# Patient Record
Sex: MALE | Race: WHITE | NOT HISPANIC OR LATINO | URBAN - METROPOLITAN AREA
[De-identification: names, ages, dates, MRNs, and addresses within clinical notes are randomized per-mention and may not be internally consistent; named-entity substitution may affect disease eponyms.]

---

## 2018-10-17 ENCOUNTER — TRANSFERRED RECORDS (OUTPATIENT)
Dept: HEALTH INFORMATION MANAGEMENT | Facility: CLINIC | Age: 43
End: 2018-10-17

## 2019-07-18 DIAGNOSIS — H35.012 RETINAL VASCULAR CHANGES, LEFT: Primary | ICD-10-CM

## 2019-07-19 ENCOUNTER — OFFICE VISIT (OUTPATIENT)
Dept: OPHTHALMOLOGY | Facility: CLINIC | Age: 44
End: 2019-07-19
Attending: OPHTHALMOLOGY
Payer: COMMERCIAL

## 2019-07-19 DIAGNOSIS — H35.352 CME (CYSTOID MACULAR EDEMA), LEFT: ICD-10-CM

## 2019-07-19 DIAGNOSIS — H35.012 RETINAL VASCULAR CHANGES, LEFT: Primary | ICD-10-CM

## 2019-07-19 PROCEDURE — G0463 HOSPITAL OUTPT CLINIC VISIT: HCPCS | Mod: ZF,25

## 2019-07-19 PROCEDURE — 92133 CPTRZD OPH DX IMG PST SGM ON: CPT | Mod: ZF | Performed by: OPHTHALMOLOGY

## 2019-07-19 PROCEDURE — 92235 FLUORESCEIN ANGRPH MLTIFRAME: CPT | Mod: ZF | Performed by: OPHTHALMOLOGY

## 2019-07-19 PROCEDURE — 92134 CPTRZ OPH DX IMG PST SGM RTA: CPT | Mod: ZF | Performed by: OPHTHALMOLOGY

## 2019-07-19 RX ORDER — COVID-19 ANTIGEN TEST
220 KIT MISCELLANEOUS PRN
COMMUNITY

## 2019-07-19 RX ORDER — PREDNISOLONE ACETATE 10 MG/ML
SUSPENSION/ DROPS OPHTHALMIC
Qty: 10 ML | Refills: 1 | Status: SHIPPED | OUTPATIENT
Start: 2019-07-19 | End: 2019-11-22

## 2019-07-19 ASSESSMENT — VISUAL ACUITY
METHOD: SNELLEN - LINEAR
OS_CC: 20/100
OD_CC: 20/30
OD_PH_CC: 20/20
OS_CC+: -1
OD_CC+: -1
OS_PH_CC: 20/70
OD_PH_CC+: -1

## 2019-07-19 ASSESSMENT — CONF VISUAL FIELD
METHOD: COUNTING FINGERS
OS_NORMAL: 1

## 2019-07-19 ASSESSMENT — REFRACTION_WEARINGRX
OS_CYLINDER: +1.75
SPECS_TYPE: SVL
OD_CYLINDER: +2.75
OS_SPHERE: -13.25
OS_AXIS: 040
OD_SPHERE: -12.25
OD_AXIS: 151

## 2019-07-19 ASSESSMENT — EXTERNAL EXAM - RIGHT EYE: OD_EXAM: NORMAL

## 2019-07-19 ASSESSMENT — EXTERNAL EXAM - LEFT EYE: OS_EXAM: NORMAL

## 2019-07-19 ASSESSMENT — TONOMETRY
OS_IOP_MMHG: 13
IOP_METHOD: APPLANATION
OD_IOP_MMHG: 12

## 2019-07-19 ASSESSMENT — CUP TO DISC RATIO
OD_RATIO: 0.2
OS_RATIO: 0.2

## 2019-07-19 ASSESSMENT — SLIT LAMP EXAM - LIDS
COMMENTS: NORMAL
COMMENTS: NORMAL

## 2019-07-19 NOTE — LETTER
"7/19/2019       RE: Zach Zaragoza  [address]  [place] [place] [facility] [address]     Dear Dr. Johnson,    Thank you for referring your patient, Zach Zaragoza, to the uveitis clinic at the [facility]. I am not certain if he has an inflammatory retinal vasculitis or if his ocular disease is more of a primary vascular disease. Please see a copy of my visit note below.    HPI: Zach Zaragoza is a 43 year old male referred by Dr. 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 Mr. Zaragoza reports that in 2007 he had sudden onset of blurry vision, which he describes as patchy, left eye only. He was seen by an eye care provider in [place], told that he had \"burst capillaries\" in the back of his left eye, and treated with corticosteroid eye drops left eye. Mr. Zaragoza reports that his vision left eye seemed to return to normal after 2 weeks of eye drops, but he did not follow up for eye care at that time. About 1-2 years ago he noticed that vision left eye was blurry again; the blurry vision has been stable since it started. No flashes of light and no new floaters. No concerns right eye.    Current ocular medications: none.  Prior ocular medications: topical corticosteroids.    Ocular history:   1. Retinal vascular abnormality left eye, as above.  2. High myopia both eyes.  3. No history of ocular trauma, surgery, or laser.    Medical history:  1. No chronic medication conditions.  2. Syncopal episode with emesis in Sept 2018 -> unremarkable physical exam/workup at [facility] [place].  3. Uses over the counter melatonin.  4. S/p wisdom teeth extraction (1992).    Review of systems: Negative for fatigue, weight loss, fevers, chills, night sweats, frequent headaches, seizure, oral ulcers, genital ulcers, pathergy reaction, recurrent nosebleeds, sinusitis, hearing loss, tinnitus, chronic cough, chest pain, shortness of breath, skin rash, tick bite, easy bruising, easy bleeding, joint " "pain/stiffness, back pain, recurrent diarrhea, bloody stools, bloody urine.    Family history: Positive for retinal vascular disease \"a blood vessel \" (GM), diabetes mellitus, cancer, arthritis. Negative for autoimmune disease.    Social history: Quit smoking in ; 1-2 alcoholic beverages per day; no IVDU. Works as a . Has never lived outside the [facility], but has traveled to [place], [place], and [place]. Currently has a cat as a pet and previously had a dog.    Laboratory/Imaging Results:  18 complete blood count (CBC) without diff and HIV normal/negative. Glucose mildly elevated at 108.  9.5.18 chest x-ray read as normal.    Ocular Imaging:  Macular OCT 19:  Right eye: within normal limits  Left eye: moderate cystoid macular edema, mild attenuation of EZ centrally    Retinal nerve fiber layer OCT 19:  Right eye: mild vitreopapillary traction, no thinning, G106  Left eye: mild increase in nerve fiber layer thickness compared to right eye, G130    Optos fluorescein angiography 19:  Right eye: ?normal (poor patient cooperation)  Left eye: transit not visualized (poor patient cooperation) but both A/V filled at 30 sec, late petalloid macular leakage, late staining along large venuoles, peripheral point aneurysm-like leakage    Impression/Plan:  1. Retinal vascular changes left eye, with peripheral exudates and telangiectasias. Differential diagnosis: adult-onset Noelle disease vs hemoglobinopathy vs old retinal vein occlusion vs inflammatory retinal vasculitis vs other. Not consistent with diabetic retinopathy and blood glucose 108 last year (likely not fasting test). Unlikely to be ocular ischemic syndrome.   - Quantiferon-Tb, syphilis IgG ordered to rule out infectious causes   - Anti-nuclear antibody, anti-dsDNA, ACE, chest x-ray, ANCA testing ordered for systemic inflammatory eval   - Obtain  records from [place]   - Consider hematology workup if infectious/autoimmune " testing is negative  2. Cystoid macular edema left eye, likely chronic given ellipsoid zone attenuation on OCT.   - Predforte left eye 6x/day for 2 weeks then four times a day    - Consider anti-VEGF injection if not improved next visit    Return to uveitis clinic in 1 month, V/T/D/mac OCT, sooner as needed     Attending Physician Attestation:  Complete documentation of historical and exam elements from today's encounter can be found in the full encounter summary report (not reduplicated in this progress note).  I personally obtained the chief complaint(s) and history of present illness.  I confirmed and edited as necessary the review of systems, past medical/surgical history, family history, social history, and examination findings as documented by others; and I examined the patient myself.  I personally reviewed the relevant tests, images, and reports as documented above.  I formulated and edited as necessary the assessment and plan and discussed the findings and management plan with the patient and family.  - Wanda Duggan M.D.      Again, thank you for allowing me to participate in the care of your patient.      Sincerely,    Wanda Duggan MD

## 2019-07-19 NOTE — PROGRESS NOTES
"HPI: Zach Zaragoza is a 43 year old male referred by Dr. 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 Mr. Zaragoza reports that in  he had sudden onset of blurry vision, which he describes as patchy, left eye only. He was seen by an eye care provider in [place], told that he had \"burst capillaries\" in the back of his left eye, and treated with corticosteroid eye drops left eye. Mr. Zaragoza reports that his vision left eye seemed to return to normal after 2 weeks of eye drops, but he did not follow up for eye care at that time. About 1-2 years ago he noticed that vision left eye was blurry again; the blurry vision has been stable since it started. No flashes of light and no new floaters. No concerns right eye.    Current ocular medications: none.  Prior ocular medications: topical corticosteroids.    Ocular history:   1. Retinal vascular abnormality left eye, as above.  2. High myopia both eyes.  3. No history of ocular trauma, surgery, or laser.    Medical history:  1. No chronic medication conditions.  2. Syncopal episode with emesis in 2018 -> unremarkable physical exam/workup at [facility] [place].  3. Uses over the counter melatonin.  4. S/p wisdom teeth extraction ().    Review of systems: Negative for fatigue, weight loss, fevers, chills, night sweats, frequent headaches, seizure, oral ulcers, genital ulcers, pathergy reaction, recurrent nosebleeds, sinusitis, hearing loss, tinnitus, chronic cough, chest pain, shortness of breath, skin rash, tick bite, easy bruising, easy bleeding, joint pain/stiffness, back pain, recurrent diarrhea, bloody stools, bloody urine.    Family history: Positive for retinal vascular disease \"a blood vessel \" (), diabetes mellitus, cancer, arthritis. Negative for autoimmune disease.    Social history: Quit smoking in ; 1-2 alcoholic beverages per day; no IVDU. Works as a . [place] , but [place] to [place], and " [place]. Currently has a cat as a pet and previously had a dog.    Laboratory/Imaging Results:  9.20.18 complete blood count (CBC) without diff and HIV normal/negative. Glucose mildly elevated at 108.  9.5.18 chest x-ray read as normal.    Ocular Imaging:  Macular OCT 7.19.19:  Right eye: within normal limits  Left eye: moderate cystoid macular edema, mild attenuation of EZ centrally    Retinal nerve fiber layer OCT 7.19.19:  Right eye: mild vitreopapillary traction, no thinning, G106  Left eye: mild increase in nerve fiber layer thickness compared to right eye, G130    Optos fluorescein angiography 7.19.19:  Right eye: ?normal (poor patient cooperation)  Left eye: transit not visualized (poor patient cooperation) but both A/V filled at 30 sec, late petalloid macular leakage, late staining along large venuoles, peripheral point aneurysm-like leakage    Impression/Plan:  1. Retinal vascular changes left eye, with peripheral exudates and telangiectasias. Differential diagnosis: adult-onset Estancia disease vs hemoglobinopathy vs old retinal vein occlusion vs inflammatory retinal vasculitis vs other. Not consistent with diabetic retinopathy and blood glucose 108 last year (likely not fasting test). Unlikely to be ocular ischemic syndrome.   - Quantiferon-Tb, syphilis IgG ordered to rule out infectious causes   - Anti-nuclear antibody, anti-dsDNA, ACE, chest x-ray, ANCA testing ordered for systemic inflammatory eval   - Obtain 2007 records from [place]   - Consider hematology workup if infectious/autoimmune testing is negative  2. Cystoid macular edema left eye, likely chronic given ellipsoid zone attenuation on OCT.   - Predforte left eye 6x/day for 2 weeks then four times a day    - Consider anti-VEGF injection if not improved next visit    Return to uveitis clinic in 1 month, V/T/D/mac OCT, sooner as needed     Attending Physician Attestation:  Complete documentation of historical and exam elements from today's  encounter can be found in the full encounter summary report (not reduplicated in this progress note).  I personally obtained the chief complaint(s) and history of present illness.  I confirmed and edited as necessary the review of systems, past medical/surgical history, family history, social history, and examination findings as documented by others; and I examined the patient myself.  I personally reviewed the relevant tests, images, and reports as documented above.  I formulated and edited as necessary the assessment and plan and discussed the findings and management plan with the patient and family.  - Wanda Duggan M.D.

## 2019-08-14 ENCOUNTER — TELEPHONE (OUTPATIENT)
Dept: OPHTHALMOLOGY | Facility: CLINIC | Age: 44
End: 2019-08-14

## 2019-08-14 NOTE — TELEPHONE ENCOUNTER
LVM for pt that we had moved his appt to 11:00 on 8/16 (previously scheduled at 3:15).  Left direct callback to confirm if this does/doesn't work.   had been left for pt last week as well to call to [facility], and we did not receive any call back at this time.    Eloisa Parker COA 2:04 PM August 14, 2019

## 2019-09-13 ENCOUNTER — OFFICE VISIT (OUTPATIENT)
Dept: OPHTHALMOLOGY | Facility: CLINIC | Age: 44
End: 2019-09-13
Attending: OPHTHALMOLOGY
Payer: COMMERCIAL

## 2019-09-13 DIAGNOSIS — H35.352 CME (CYSTOID MACULAR EDEMA), LEFT: ICD-10-CM

## 2019-09-13 DIAGNOSIS — H35.012 RETINAL VASCULAR CHANGES, LEFT: Primary | ICD-10-CM

## 2019-09-13 PROCEDURE — 92134 CPTRZ OPH DX IMG PST SGM RTA: CPT | Mod: ZF | Performed by: OPHTHALMOLOGY

## 2019-09-13 PROCEDURE — G0463 HOSPITAL OUTPT CLINIC VISIT: HCPCS | Mod: ZF

## 2019-09-13 ASSESSMENT — SLIT LAMP EXAM - LIDS
COMMENTS: NORMAL
COMMENTS: NORMAL

## 2019-09-13 ASSESSMENT — TONOMETRY
OS_IOP_MMHG: 18
IOP_METHOD: TONOPEN
OD_IOP_MMHG: 18

## 2019-09-13 ASSESSMENT — CONF VISUAL FIELD
OS_NORMAL: 1
METHOD: COUNTING FINGERS
OD_NORMAL: 1

## 2019-09-13 ASSESSMENT — VISUAL ACUITY
OS_PH_CC: 20/70
CORRECTION_TYPE: GLASSES
OD_CC: 20/25
METHOD: SNELLEN - LINEAR
OS_PH_CC+: +1
OD_CC+: +3
OS_CC: 20/80
OS_CC+: -1

## 2019-09-13 ASSESSMENT — EXTERNAL EXAM - LEFT EYE: OS_EXAM: NORMAL

## 2019-09-13 ASSESSMENT — CUP TO DISC RATIO
OD_RATIO: 0.2
OS_RATIO: 0.2

## 2019-09-13 ASSESSMENT — EXTERNAL EXAM - RIGHT EYE: OD_EXAM: NORMAL

## 2019-09-13 NOTE — PROGRESS NOTES
"HPI: Zach Zaragoza is a 43 year old male here for 2 month (supposed to be 1 month follow up). He reports that his vision is unchanged from last visit. No flashes of light and no new floaters. No concerns right eye.    Current ocular medications: Predforte left eye four times a day.  Prior ocular medications: topical corticosteroids.    Ocular history:   1. Retinal vascular abnormality left eye.  2019 was referred by Dr. 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 Mr. Zaragoza reports that in  he had sudden onset of blurry vision, which he describes as patchy, left eye only. He was seen by an eye care provider in [place], told that he had \"burst capillaries\" in the back of his left eye, and treated with corticosteroid eye drops left eye. Mr. Zaragoza reports that his vision left eye seemed to return to normal after 2 weeks of eye drops, but he did not follow up for eye care at that time. About [id number] he noticed that vision left eye was blurry again, but he did not seek eye care until Oct 2018 with Dr. Johnson and did not present to uveitis clinic until 2019.  2. High myopia both eyes.  3. No history of ocular trauma, surgery, or laser. No history of ocular injection.    Medical history:  1. No chronic medication conditions.  2. Syncopal episode with emesis in 2018 -> unremarkable physical exam/workup at [facility] [place].  3. Uses over the counter melatonin.  4. S/p wisdom teeth extraction ().    Review of systems: Negative for fatigue, weight loss, fevers, chills, night sweats, frequent headaches, seizure, oral ulcers, genital ulcers, pathergy reaction, recurrent nosebleeds, sinusitis, hearing loss, tinnitus, chronic cough, chest pain, shortness of breath, skin rash, tick bite, easy bruising, easy bleeding, joint pain/stiffness, back pain, recurrent diarrhea, bloody stools, bloody urine.    Family history: Positive for retinal vascular disease \"a blood vessel \" (GM), diabetes mellitus, cancer, " arthritis. Negative for autoimmune disease.    Social history: Quit smoking in 1999; 1-2 alcoholic beverages per day; no IVDU. Works as a . Has never lived outside the [facility], but has traveled to [place], [place], and [place]. Currently has a cat as a pet and previously had a dog.    Laboratory/Imaging Results:  9.20.18 complete blood count (CBC) without diff and HIV normal/negative. Glucose mildly elevated at 108.  9.5.18 chest x-ray read as normal.    Ocular Imaging:  Macular OCT 9.13.19:  Right eye: within normal limits, stable.  Left eye: worsening of cystoid macular edema vs 7.19.19, mild attenuation of EZ centrally    Retinal nerve fiber layer OCT 7.19.19:  Right eye: mild vitreopapillary traction, no thinning, G106  Left eye: mild increase in nerve fiber layer thickness compared to right eye, G130    Optos fluorescein angiography 7.19.19:  Right eye: ?normal (poor patient cooperation)  Left eye: transit not visualized (poor patient cooperation) but both A/V filled at 30 sec, late petalloid macular leakage, late staining along large venuoles, peripheral point aneurysm-like leakage    Impression/Plan:  1. Retinal vascular changes left eye, with peripheral exudates and telangiectasias. Differential diagnosis: adult-onset Noelle disease vs hemoglobinopathy vs old retinal vein occlusion vs inflammatory retinal vasculitis vs other. Not consistent with diabetic retinopathy and blood glucose 108 last year (likely not fasting test). Unlikely to be ocular ischemic syndrome.   - Quantiferon-Tb, syphilis IgG ordered to rule out infectious causes   - Anti-nuclear antibody, anti-dsDNA, ACE, chest x-ray, ANCA testing ordered for systemic inflammatory eval   - Obtain 2007 records from [place] if possible (clinic that patient recalled did not have any records)   - Consider hematology workup if infectious/autoimmune testing is negative  2. Cystoid macular edema left eye, likely chronic given ellipsoid zone  attenuation on OCT. Worsening despite Predforte, and retinal vascular changes are more consistent with adult-onset Noelle.   - Stop Predforte    - Recommend anti-VEGF injectio left eye today; patient wants to wait until he know for certain what the out of pocket charge will be    Return to uveitis clinic in 1 month, V/T/D/mac OCT, sooner as needed. Plan for injection sooner, depending on info from financial counselor.    Attending Physician Attestation:  Complete documentation of historical and exam elements from today's encounter can be found in the full encounter summary report (not reduplicated in this progress note).  I personally obtained the chief complaint(s) and history of present illness.  I confirmed and edited as necessary the review of systems, past medical/surgical history, family history, social history, and examination findings as documented by others; and I examined the patient myself.  I personally reviewed the relevant tests, images, and reports as documented above.  I formulated and edited as necessary the assessment and plan and discussed the findings and management plan with the patient and family.  - Wanda Duggan M.D.

## 2019-09-13 NOTE — NURSING NOTE
Chief Complaints and History of Present Illnesses   Patient presents with     Follow Up     Chief Complaint(s) and History of Present Illness(es)     Follow Up     Laterality: left eye    Frequency: constantly    Timing: throughout the day    Course: stable    Associated symptoms: Negative for eye pain    Pain scale: 0/10              Comments     Recheck retinal vasculitis of LE  No new VA changes, denies pain.  Prednisolone BID to DARION Mancilla COT 2:45 PM 09/13/2019

## 2019-10-07 ENCOUNTER — TELEPHONE (OUTPATIENT)
Dept: OPHTHALMOLOGY | Facility: CLINIC | Age: 44
End: 2019-10-07

## 2019-10-16 ENCOUNTER — OFFICE VISIT (OUTPATIENT)
Dept: OPHTHALMOLOGY | Facility: CLINIC | Age: 44
End: 2019-10-16
Attending: OPHTHALMOLOGY
Payer: COMMERCIAL

## 2019-10-16 DIAGNOSIS — H35.352 CME (CYSTOID MACULAR EDEMA), LEFT: Primary | ICD-10-CM

## 2019-10-16 DIAGNOSIS — H35.012 RETINAL VASCULAR CHANGES, LEFT: ICD-10-CM

## 2019-10-16 PROCEDURE — 92134 CPTRZ OPH DX IMG PST SGM RTA: CPT | Mod: ZF | Performed by: OPHTHALMOLOGY

## 2019-10-16 PROCEDURE — C9257 BEVACIZUMAB INJECTION: HCPCS | Mod: ZF | Performed by: OPHTHALMOLOGY

## 2019-10-16 PROCEDURE — G0463 HOSPITAL OUTPT CLINIC VISIT: HCPCS | Mod: ZF,25

## 2019-10-16 PROCEDURE — 25000128 H RX IP 250 OP 636: Mod: ZF | Performed by: OPHTHALMOLOGY

## 2019-10-16 PROCEDURE — 67028 INJECTION EYE DRUG: CPT | Mod: LT,ZF | Performed by: OPHTHALMOLOGY

## 2019-10-16 RX ADMIN — Medication 1.25 MG: at 14:39

## 2019-10-16 ASSESSMENT — EXTERNAL EXAM - LEFT EYE: OS_EXAM: NORMAL

## 2019-10-16 ASSESSMENT — SLIT LAMP EXAM - LIDS: COMMENTS: NORMAL

## 2019-10-16 ASSESSMENT — VISUAL ACUITY
OS_CC: 20/80
OS_CC+: -1
CORRECTION_TYPE: GLASSES
METHOD: SNELLEN - LINEAR
OD_CC+: -2+2
OD_CC: 20/30
OD_PH_CC: 20/30
OD_PH_CC+: +3

## 2019-10-16 ASSESSMENT — TONOMETRY
IOP_METHOD: TONOPEN
OD_IOP_MMHG: 16
OS_IOP_MMHG: 14

## 2019-10-16 ASSESSMENT — CUP TO DISC RATIO: OS_RATIO: 0.2

## 2019-10-16 NOTE — NURSING NOTE
Chief Complaints and History of Present Illnesses   Patient presents with     Follow Up     Retinal vascular changes, left      Chief Complaint(s) and History of Present Illness(es)     Follow Up     Associated symptoms: floaters (No change.).  Negative for eye pain, dryness, tearing and flashes    Comments: Retinal vascular changes, left               Comments     Pt states VA is the same since last visit.  Pt has no pain other concerns at this time.    DARION Almeida October 16, 2019 2:03 PM

## 2019-10-17 NOTE — PROGRESS NOTES
"HPI: Zach Zaragoza is a 43 year old male here for Avastin intravitreal injection left eye (recommended last visit but patient wanted to wait until medication was reviewed by his insurance so he would know the out of pocket charge). He reports that his vision is unchanged from last visit and continues to be poor left eye compared to right eye. No flashes of light and no new floaters. No concerns right eye.    Current ocular medications: None.  Prior ocular medications: Predforte (ineffective for cystoid macular edema).    Ocular history:   1. Retinal vascular abnormality left eye.  2019 was referred by Dr. 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 Mr. Zaragoza reports that in  he had sudden onset of blurry vision, which he describes as patchy, left eye only. He was seen by an eye care provider in [place], told that he had \"burst capillaries\" in the back of his left eye, and treated with corticosteroid eye drops left eye. Mr. Zaragoza reports that his vision left eye seemed to return to normal after 2 weeks of eye drops, but he did not follow up for eye care at that time. About [id number] he noticed that vision left eye was blurry again, but he did not seek eye care until Oct 2018 with Dr. Johnson and did not present to uveitis clinic until 2019.  2. High myopia both eyes.  3. No history of ocular trauma, surgery, or laser. No history of ocular injection.    Medical history:  1. No chronic medication conditions.  2. Syncopal episode with emesis in 2018 -> [facility].  3. Uses over the counter melatonin.  4. S/p wisdom teeth extraction ().    Family history: Positive for retinal vascular disease \"a blood vessel \" (), diabetes mellitus, cancer, arthritis. Negative for autoimmune disease.    Social history: Quit smoking in ; 1-2 alcoholic beverages per day; no IVDU. Works as a . Has never lived outside the [facility], but has traveled to " [place], [place], and [place]. Currently has a cat as a pet and previously had a dog.    Laboratory/Imaging Results:  9.20.18 complete blood count (CBC) without diff and HIV normal/negative. Glucose mildly elevated at 108.  9.5.18 chest x-ray read as normal.    Ocular Imaging:  Macular OCT 10.16.19:  Right eye: within normal limits, stable.  Left eye: worsening of cystoid macular edema vs 9.13.19, mild attenuation of EZ centrally    Retinal nerve fiber layer OCT 7.19.19:  Right eye: mild vitreopapillary traction, no thinning, G106  Left eye: mild increase in nerve fiber layer thickness compared to right eye, G130    Optos fluorescein angiography 7.19.19:  Right eye: ?normal (poor patient cooperation)  Left eye: transit not visualized (poor patient cooperation) but both A/V filled at 30 sec, late petalloid macular leakage, late staining along large venuoles, peripheral point aneurysm-like leakage    Impression/Plan:  1. Retinal vascular changes left eye, with peripheral exudates and telangiectasias. Differential diagnosis: adult-onset Noelle disease vs hemoglobinopathy vs old retinal vein occlusion vs inflammatory retinal vasculitis vs other. Not consistent with diabetic retinopathy and blood glucose 108 last year (likely not fasting test). Unlikely to be ocular ischemic syndrome.   - If no improvement with anti-VEGF, will obtain Quantiferon-Tb, syphilis IgG, repeat HIV to rule out infectious causes and KEZIA, anti-dsDNA, ACE, chest x-ray, ANCA testing ordered for systemic inflammatory eval(patient wants to limit out of pocket costs)   - Obtain 2007 records from [place] if possible (clinic that patient recalled did not have any records)   - Consider hematology workup if infectious/autoimmune testing is negative  2. Cystoid macular edema left eye, likely chronic given ellipsoid zone attenuation on OCT. Worsening despite Predforte, and retinal vascular changes are more consistent with adult-onset Circle.   - Avastin left  eye given today. Risks/benefits/alternatives discussed. This is the patient's first intravitreal injection, so extensive counseling was performed. We discussed expected symptoms, including blurry vision on the day of injection, small areas of eye redness, mild irritation that resolves within 24 hr. The patient agrees to return to clinic immediately for more worrisome symptoms suggestive of infection (worsening eye redness/pain and/or blurry vision) or retinal detachment (many new floaters, flashing lights, curtain over vision).   - We discussed that a series of injections may be needed, depending on his response to treatment.   - Patient will self-monitor vision in left eye following injection to report on whether Avastin subjectively improves his vision at next visit.    Return to uveitis clinic in 1 month, V/T/D/mac OCT, sooner as needed.    Attending Physician Attestation:  Complete documentation of historical and exam elements from today's encounter can be found in the full encounter summary report (not reduplicated in this progress note).  I personally obtained the chief complaint(s) and history of present illness.  I confirmed and edited as necessary the review of systems, past medical/surgical history, family history, social history, and examination findings as documented by others; and I examined the patient myself.  I personally reviewed the relevant tests, images, and reports as documented above.  I formulated and edited as necessary the assessment and plan and discussed the findings and management plan with the patient and family.  - Wanda Duggan M.D.

## 2019-11-22 ENCOUNTER — OFFICE VISIT (OUTPATIENT)
Dept: OPHTHALMOLOGY | Facility: CLINIC | Age: 44
End: 2019-11-22
Attending: OPHTHALMOLOGY
Payer: COMMERCIAL

## 2019-11-22 DIAGNOSIS — H35.352 CME (CYSTOID MACULAR EDEMA), LEFT: Primary | ICD-10-CM

## 2019-11-22 DIAGNOSIS — H35.012 RETINAL VASCULAR CHANGES, LEFT: ICD-10-CM

## 2019-11-22 PROCEDURE — 92134 CPTRZ OPH DX IMG PST SGM RTA: CPT | Mod: ZF | Performed by: OPHTHALMOLOGY

## 2019-11-22 PROCEDURE — G0463 HOSPITAL OUTPT CLINIC VISIT: HCPCS | Mod: ZF

## 2019-11-22 ASSESSMENT — REFRACTION_WEARINGRX
OD_SPHERE: -12.25
SPECS_TYPE: SVL
OD_AXIS: 151
OD_CYLINDER: +2.75
OS_SPHERE: -13.25
OS_AXIS: 040
OS_CYLINDER: +1.75

## 2019-11-22 ASSESSMENT — TONOMETRY
IOP_METHOD: TONOPEN
OD_IOP_MMHG: 13
OS_IOP_MMHG: 14

## 2019-11-22 ASSESSMENT — VISUAL ACUITY
OS_PH_CC: 20/60
OS_CC: 20/80
OD_CC: 20/25
OS_PH_CC+: -2
METHOD: SNELLEN - LINEAR
OD_CC+: -2
OS_CC+: +1

## 2019-11-22 ASSESSMENT — SLIT LAMP EXAM - LIDS
COMMENTS: NORMAL
COMMENTS: NORMAL

## 2019-11-22 ASSESSMENT — CONF VISUAL FIELD
OS_NORMAL: 1
OD_NORMAL: 1
METHOD: COUNTING FINGERS

## 2019-11-22 ASSESSMENT — EXTERNAL EXAM - RIGHT EYE: OD_EXAM: NORMAL

## 2019-11-22 ASSESSMENT — CUP TO DISC RATIO
OD_RATIO: 0.2
OS_RATIO: 0.2

## 2019-11-22 ASSESSMENT — EXTERNAL EXAM - LEFT EYE: OS_EXAM: NORMAL

## 2019-11-22 NOTE — PROGRESS NOTES
"HPI: Zach Zaragoza is a 43 year old male here for 1 month follow up after initial Avastin intravitreal injection left eye. He reports that his vision had seemed unchanged from last visit (better right eye), though there did appear to be improvement in vision left eye with vision testing today - seemed easier. No flashes of light and no new floaters. No concerns right eye.    Current ocular medications: None.  Prior ocular medications: Predforte (ineffective for cystoid macular edema).    Ocular history:   1. Retinal vascular abnormality left eye.  2019 was referred by Dr. 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 Mr. Zaragoza reports that in  he had sudden onset of blurry vision, which he describes as patchy, left eye only. He was seen by an eye care provider in [place], told that he had \"burst capillaries\" in the back of his left eye, and treated with corticosteroid eye drops left eye. Mr. Zaragoza reports that his vision left eye seemed to return to normal after 2 weeks of eye drops, but he did not follow up for eye care at that time. About 5838-9957 he noticed that vision left eye was blurry again, but he did not seek eye care until Oct 2018 with Dr. Johnson and did not present to uveitis clinic until 2019.  2. High myopia both eyes.  3. No history of ocular trauma, surgery, or laser.    Medical history:  1. No chronic medication conditions.  2. Syncopal episode with emesis in 2018 [place] [facility] [place]  3. Uses over the counter melatonin.  4. S/p wisdom teeth extraction ().    Family history: Positive for retinal vascular disease \"a blood vessel \" (), diabetes mellitus, cancer, arthritis. Negative for autoimmune disease.    Social history: Quit smoking in ; 1-2 alcoholic beverages per day; no IVDU. Works as a . [place] , but [place] traveled to [place], [place], and [place]. Currently has a cat as a pet and " previously had a dog.    Laboratory/Imaging Results:  9.20.18 complete blood count (CBC) without diff and HIV normal/negative. Glucose mildly elevated at 108.  9.5.18 chest x-ray read as normal.    Ocular Imaging:  Macular OCT 11.22.19:  Right eye: within normal limits, stable.  Left eye: substantial improvement in cystoid macular edema vs 10.16.19 with return of foveal contour, mild attenuation of EZ centrally    Retinal nerve fiber layer OCT 7.19.19:  Right eye: mild vitreopapillary traction, no thinning, G106  Left eye: mild increase in nerve fiber layer thickness compared to right eye, G130    Optos fluorescein angiography 7.19.19:  Right eye: ?normal (poor patient cooperation)  Left eye: transit not visualized (poor patient cooperation) but both A/V filled at 30 sec, late petalloid macular leakage, late staining along large venuoles, peripheral point aneurysm-like leakage    Impression/Plan:  1. Retinal vascular changes ( peripheral exudates and telangiectasias) left eye, most consistent with adult-onset [place] disease, although differential diagnosis includes hemoglobinopathy, old retinal vein occlusion, inflammatory retinal vasculitis, other. Not consistent with diabetic retinopathy and blood glucose 108 last year (likely not fasting test). Unlikely to be ocular ischemic syndrome.   - Obtain 2007 records from [place] if possible (clinic that patient recalled did not have any records)   - Additional testing for an underlying inflammatory or systemic vascular etiology should be considered if the right eye develops similar retinal vascular changes  2. Cystoid macular edema left eye secondary to #1, likely chronic given ellipsoid zone attenuation on OCT. Cystoid macular edema much improved today, 1 month s/p intravitreal Avastin left eye #1 (10.16.19).   - Anti-VEGF not indicated today; recheck in 1 month   - Again, we discussed that a series of injections may be needed for adequate control of the cystoid  macular edema  3. High myopia both eyes.   - Manifest refraction once #2 is stabilized    Return to uveitis clinic in 1 month, V/T/D/mac OCT, sooner as needed.    Attending Physician Attestation:  Complete documentation of historical and exam elements from today's encounter can be found in the full encounter summary report (not reduplicated in this progress note).  I personally obtained the chief complaint(s) and history of present illness.  I confirmed and edited as necessary the review of systems, past medical/surgical history, family history, social history, and examination findings as documented by others; and I examined the patient myself.  I personally reviewed the relevant tests, images, and reports as documented above.  I formulated and edited as necessary the assessment and plan and discussed the findings and management plan with the patient and family.  - Wanda Duggan M.D.

## 2019-11-22 NOTE — NURSING NOTE
Chief Complaints and History of Present Illnesses   Patient presents with     Follow Up     Chief Complaint(s) and History of Present Illness(es)     Follow Up     Associated symptoms: floaters (no new ones).  Negative for eye pain, dryness and flashes              Comments     CME (cystoid macular edema), left. He states no change in vision since last visit.     Javier Alexander COT 2:47 PM November 22, 2019

## 2019-12-20 ENCOUNTER — OFFICE VISIT (OUTPATIENT)
Dept: OPHTHALMOLOGY | Facility: CLINIC | Age: 44
End: 2019-12-20
Attending: OPHTHALMOLOGY
Payer: COMMERCIAL

## 2019-12-20 DIAGNOSIS — H35.012 RETINAL VASCULAR CHANGES, LEFT: ICD-10-CM

## 2019-12-20 DIAGNOSIS — H35.352 CME (CYSTOID MACULAR EDEMA), LEFT: Primary | ICD-10-CM

## 2019-12-20 PROCEDURE — 92134 CPTRZ OPH DX IMG PST SGM RTA: CPT | Mod: ZF | Performed by: OPHTHALMOLOGY

## 2019-12-20 PROCEDURE — 67028 INJECTION EYE DRUG: CPT | Mod: LT,ZF | Performed by: OPHTHALMOLOGY

## 2019-12-20 PROCEDURE — G0463 HOSPITAL OUTPT CLINIC VISIT: HCPCS | Mod: ZF

## 2019-12-20 PROCEDURE — C9257 BEVACIZUMAB INJECTION: HCPCS | Mod: ZF | Performed by: OPHTHALMOLOGY

## 2019-12-20 PROCEDURE — 25000128 H RX IP 250 OP 636: Mod: ZF | Performed by: OPHTHALMOLOGY

## 2019-12-20 RX ADMIN — Medication 1.25 MG: at 11:55

## 2019-12-20 ASSESSMENT — REFRACTION_WEARINGRX
OD_SPHERE: -12.25
OD_CYLINDER: +2.75
OD_AXIS: 151
SPECS_TYPE: SVL
OS_CYLINDER: +1.75
OS_AXIS: 040
OS_SPHERE: -13.25

## 2019-12-20 ASSESSMENT — VISUAL ACUITY
OS_PH_CC+: -1
OD_CC: 20/25
CORRECTION_TYPE: GLASSES
OS_CC+: -2
OD_CC+: -2
METHOD: SNELLEN - LINEAR
OS_CC: 20/70
OS_PH_CC: 20/60

## 2019-12-20 ASSESSMENT — TONOMETRY
OS_IOP_MMHG: 19
IOP_METHOD: TONOPEN
OD_IOP_MMHG: 15

## 2019-12-20 ASSESSMENT — SLIT LAMP EXAM - LIDS
COMMENTS: NORMAL
COMMENTS: NORMAL

## 2019-12-20 ASSESSMENT — CUP TO DISC RATIO
OD_RATIO: 0.2
OS_RATIO: 0.2

## 2019-12-20 ASSESSMENT — CONF VISUAL FIELD
METHOD: COUNTING FINGERS
OS_NORMAL: 1

## 2019-12-20 ASSESSMENT — EXTERNAL EXAM - LEFT EYE: OS_EXAM: NORMAL

## 2019-12-20 ASSESSMENT — EXTERNAL EXAM - RIGHT EYE: OD_EXAM: NORMAL

## 2019-12-20 NOTE — NURSING NOTE
Chief Complaint(s) and History of Present Illness(es)     Follow Up     In left eye.  Associated symptoms include Negative for dryness, eye pain, redness and tearing.              Comments     1 month f/u for Retinal vascular abnormality left eye. Pt notes no changes in vision BE since his last visit 1 month ago.     Ocular meds: none    BONNIE Julio 9:47 AM December 20, 2019

## 2019-12-20 NOTE — PROGRESS NOTES
"HPI: Zach Zaragoza is a 44 year old male here for 1 month follow up, now 2 months s/p initial Avastin intravitreal injection left eye. He reports no change in vision since last visit, still blurry left eye. No concerns right eye.    Current ocular medications: None.  Prior ocular medications: Predforte (ineffective for cystoid macular edema).    Ocular history:   1. 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   -  sudden onset of blurry vision \"patchy\" left eye only. He was seen by an eye care provider in [place], told that he had \"burst capillaries\" in the back of his left eye, and treated with corticosteroid eye drops left eye. Mr. Zaragoza reports that his vision left eye seemed to return to normal after 2 weeks of eye drops, but he did not follow up for eye care at that time. About 9913-1348 he noticed that vision left eye was blurry again, but he did not seek eye care until Oct 2018 with Dr. Johnson and did not present to uveitis clinic until 2019.   -  S/p intravitreal Avastin left eye x2 (10.16.19, effective for cystoid macular edema), 19  2. High myopia both eyes.  3. No history of ocular trauma, surgery, or laser.    Medical history:  1. No chronic medication conditions.  2. Syncopal episode with emesis in 2018 [place] [facility] [place]  3. Uses over the counter melatonin.  4. S/p wisdom teeth extraction ().    Family history: Positive for retinal vascular disease \"a blood vessel \" (), diabetes mellitus, cancer, arthritis. Negative for autoimmune disease.    Social history: Quit smoking in ; 1-2 alcoholic beverages per day; no IVDU. Works as a . [place] , but has traveled to [place], [place], and [place]. Currently has a cat as a pet and previously had a dog.    Laboratory/Imaging Results:  18 complete blood count (CBC) without diff and HIV normal/negative. Glucose " mildly elevated at 108.  9.5.18 chest x-ray read as normal.    Ocular Imaging:  Macular OCT 12.20.19:  Right eye: within normal limits, stable.  Left eye: moderate worsening of cystoid macular edema vs 11.22.19, now with central intraretinal fluid, stable mild attenuation of EZ centrally    Retinal nerve fiber layer OCT 7.19.19:  Right eye: mild vitreopapillary traction, no thinning, G106  Left eye: mild increase in nerve fiber layer thickness compared to right eye, G130    Optos fluorescein angiography 7.19.19:  Right eye: ?normal (poor patient cooperation)  Left eye: transit not visualized (poor patient cooperation) but both A/V filled at 30 sec, late petalloid macular leakage, late staining along large venuoles, peripheral point aneurysm-like leakage    Impression/Plan:  1. 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 Not consistent with diabetic retinopathy and blood glucose 108 in 2018 (likely not fasting test). Unlikely to be ocular ischemic syndrome.   - Obtain 2007 records from [place] if possible (clinic that patient recalled did not have any records)   - Additional testing for an underlying inflammatory or systemic vascular etiology should be considered if the right eye develops similar retinal vascular changes    2. Cystoid macular edema left eye secondary to #1, likely chronic given ellipsoid zone attenuation on OCT. Cystoid macular edema had improved with intravitreal Avastin left eye #1 (10.16.19) but now there is central recurrence of fluid.   - Avastin #2 left eye given today, patient counseled to call clinic immediately with any worrisome symptoms post-injection, including eye redness, pain, blurry vision    3. High myopia both eyes.   - Manifest refraction once #2 is stabilized, likely next  visit 1 month s/p injection    Return to uveitis clinic in 1 month, V/T/MR/D/mac OCT, sooner as needed.    Attending Physician Attestation:  Complete documentation of historical and exam elements from today's encounter can be found in the full encounter summary report (not reduplicated in this progress note).  I personally obtained the chief complaint(s) and history of present illness.  I confirmed and edited as necessary the review of systems, past medical/surgical history, family history, social history, and examination findings as documented by others; and I examined the patient myself.  I personally reviewed the relevant tests, images, and reports as documented above.  I formulated and edited as necessary the assessment and plan and discussed the findings and management plan with the patient and family.  - Wanda Duggan M.D.

## 2020-01-24 ENCOUNTER — OFFICE VISIT (OUTPATIENT)
Dept: OPHTHALMOLOGY | Facility: CLINIC | Age: 45
End: 2020-01-24
Attending: OPHTHALMOLOGY
Payer: COMMERCIAL

## 2020-01-24 DIAGNOSIS — H35.352 CME (CYSTOID MACULAR EDEMA), LEFT: Primary | ICD-10-CM

## 2020-01-24 DIAGNOSIS — H35.012 RETINAL VASCULAR CHANGES, LEFT: ICD-10-CM

## 2020-01-24 PROCEDURE — 92015 DETERMINE REFRACTIVE STATE: CPT | Mod: ZF

## 2020-01-24 PROCEDURE — G0463 HOSPITAL OUTPT CLINIC VISIT: HCPCS | Mod: ZF

## 2020-01-24 PROCEDURE — 92134 CPTRZ OPH DX IMG PST SGM RTA: CPT | Mod: ZF | Performed by: OPHTHALMOLOGY

## 2020-01-24 ASSESSMENT — REFRACTION_WEARINGRX
OD_CYLINDER: +2.75
OD_SPHERE: -12.25
OS_AXIS: 040
OD_AXIS: 151
OS_CYLINDER: +1.75
OS_SPHERE: -13.25
SPECS_TYPE: SVL

## 2020-01-24 ASSESSMENT — VISUAL ACUITY
OD_CC+: +1
CORRECTION_TYPE: GLASSES
METHOD: SNELLEN - LINEAR
OD_CC: 20/30
OS_PH_CC: 20/60
OD_PH_CC: 20/25
OS_CC: 20/70

## 2020-01-24 ASSESSMENT — TONOMETRY
IOP_METHOD: TONOPEN
OS_IOP_MMHG: 13
OD_IOP_MMHG: 13

## 2020-01-24 ASSESSMENT — REFRACTION_MANIFEST
OD_SPHERE: -11.75
OD_AXIS: 149
OS_AXIS: 047
OS_CYLINDER: +2.50
OD_CYLINDER: +2.25
OS_SPHERE: -13.25

## 2020-01-24 ASSESSMENT — CONF VISUAL FIELD
OD_NORMAL: 1
OS_NORMAL: 1
METHOD: COUNTING FINGERS

## 2020-01-24 NOTE — PROGRESS NOTES
"HPI: Zach Zaragoza is a 44 year old male here for 1 month follow up, now 1 months s/p Avastin intravitreal injection left eye #2. He reports no change in vision since last visit, still blurry left eye, though vision left eye seemed improved during glasses check today. No concerns right eye.    Current ocular medications: None.  Prior ocular medications: Predforte (ineffective for cystoid macular edema).    Ocular history:   1. 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   -  sudden onset of blurry vision \"patchy\" left eye only. He was seen by an eye care provider in [place], told that he had \"burst capillaries\" in the back of his left eye, and treated with corticosteroid eye drops left eye. Mr. Zaragoza reports that his vision left eye seemed to return to normal after 2 weeks of eye drops, but he did not follow up for eye care at that time. About [id number] he noticed that vision left eye was blurry again, but he did not seek eye care until Oct 2018 with Dr. Johnson and did not present to uveitis clinic until 2019.   -  S/p intravitreal Avastin left eye x2 (10.16.19, effective for cystoid macular edema), 19  2. High myopia both eyes.  3. No history of ocular trauma, surgery, or laser.    Medical history:  1. No chronic medication conditions.  2. Syncopal episode with emesis in 2018 -> [place]remark[place] exam[place] [facility] [place]  3. Uses over the counter melatonin.  4. S/p wisdom teeth extraction ().    Family history: Positive for retinal vascular disease \"a blood vessel \" (), diabetes mellitus, cancer, arthritis. Negative for autoimmune disease.    Social history: Quit smoking in ; 1-2 alcoholic beverages per day; no IVDU. Works as a . [place] , but has [place] to [place], [place]. Currently has a cat as a pet and previously had a dog.    Laboratory/Imaging Results:  18 complete " blood count (CBC) without diff and HIV normal/negative. Glucose mildly elevated at 108.  9.5.18 chest x-ray read as normal.    Ocular Imaging:  Macular OCT 1.27.20:  Right eye: within normal limits, stable.  Left eye: substantial improvement of cystoid macular edema vs 12.20.19 (essentially resolved), rare hyperreflective intraretinal puncta, stable mild attenuation of EZ centrally, also some inner retinal disorganization    Retinal nerve fiber layer OCT 7.19.19:  Right eye: mild vitreopapillary traction, no thinning, G106  Left eye: mild increase in nerve fiber layer thickness compared to right eye, G130    Optos fluorescein angiography 7.19.19:  Right eye: ?normal (poor patient cooperation)  Left eye: transit not visualized (poor patient cooperation) but both A/V filled at 30 sec, late petalloid macular leakage, late staining along large venuoles, peripheral point aneurysm-like leakage    Impression/Plan:  1. Retinal vascular changes (peripheral exudates and telangiectasias) left eye, most consistent with adult-onset [place] disease, although differential diagnosis includes hemoglobinopathy, old retinal vein occlusion, inflammatory retinal vasculitis, other. Not consistent with diabetic retinopathy and blood glucose 108 in 2018 (likely not fasting test). Unlikely to be ocular ischemic syndrome.   - Obtain 2007 records from [place] if possible (clinic that patient recalled did not have any records)   - Additional testing for an underlying inflammatory or systemic vascular etiology should be considered if the right eye develops similar retinal vascular changes, but observe for now   - Consider laser photocoagulation of peripheral aneurysms and areas of nonperfusion, discussed with patient    2. Cystoid macular edema left eye secondary to #1, likely chronic given ellipsoid zone attenuation on OCT. Cystoid macular edema essentially resolved today.   - Monitor    3. High myopia both eyes.   - Manifest refraction today,  best corrected visual acuity left eye improved to 20/50.    Return to uveitis clinic in 1 month, V/T/D/mac OCT, sooner as needed.    Attending Physician Attestation:  Complete documentation of historical and exam elements from today's encounter can be found in the full encounter summary report (not reduplicated in this progress note).  I personally obtained the chief complaint(s) and history of present illness.  I confirmed and edited as necessary the review of systems, past medical/surgical history, family history, social history, and examination findings as documented by others; and I examined the patient myself.  I personally reviewed the relevant tests, images, and reports as documented above.  I formulated and edited as necessary the assessment and plan and discussed the findings and management plan with the patient and family.  - Wanda Duggan M.D.

## 2020-01-27 ASSESSMENT — EXTERNAL EXAM - RIGHT EYE: OD_EXAM: NORMAL

## 2020-01-27 ASSESSMENT — CUP TO DISC RATIO
OS_RATIO: 0.2
OD_RATIO: 0.2

## 2020-01-27 ASSESSMENT — SLIT LAMP EXAM - LIDS
COMMENTS: NORMAL
COMMENTS: NORMAL

## 2020-01-27 ASSESSMENT — EXTERNAL EXAM - LEFT EYE: OS_EXAM: NORMAL

## 2020-04-10 ENCOUNTER — OFFICE VISIT (OUTPATIENT)
Dept: OPHTHALMOLOGY | Facility: CLINIC | Age: 45
End: 2020-04-10
Attending: OPHTHALMOLOGY
Payer: COMMERCIAL

## 2020-04-10 DIAGNOSIS — H35.352 CME (CYSTOID MACULAR EDEMA), LEFT: ICD-10-CM

## 2020-04-10 DIAGNOSIS — H35.012 RETINAL VASCULAR CHANGES, LEFT: ICD-10-CM

## 2020-04-10 PROCEDURE — 25000128 H RX IP 250 OP 636: Mod: ZF | Performed by: OPHTHALMOLOGY

## 2020-04-10 PROCEDURE — C9257 BEVACIZUMAB INJECTION: HCPCS | Mod: ZF | Performed by: OPHTHALMOLOGY

## 2020-04-10 PROCEDURE — G0463 HOSPITAL OUTPT CLINIC VISIT: HCPCS | Mod: 25

## 2020-04-10 PROCEDURE — 67028 INJECTION EYE DRUG: CPT | Mod: LT,ZF | Performed by: OPHTHALMOLOGY

## 2020-04-10 PROCEDURE — 92134 CPTRZ OPH DX IMG PST SGM RTA: CPT | Mod: ZF | Performed by: OPHTHALMOLOGY

## 2020-04-10 RX ADMIN — Medication 1.25 MG: at 14:35

## 2020-04-10 ASSESSMENT — CONF VISUAL FIELD
METHOD: COUNTING FINGERS
OD_NORMAL: 1
OS_NORMAL: 1

## 2020-04-10 ASSESSMENT — VISUAL ACUITY
OS_CC+: -1
OS_PH_CC: 20/60
METHOD: SNELLEN - LINEAR
OD_CC+: -3
OD_CC: 20/25
OS_PH_CC+: -2
OS_CC: 20/100

## 2020-04-10 ASSESSMENT — REFRACTION_WEARINGRX
OD_SPHERE: -12.25
OS_SPHERE: -13.25
OD_CYLINDER: +2.75
OD_AXIS: 151
OS_AXIS: 040
OS_CYLINDER: +1.75
SPECS_TYPE: SVL

## 2020-04-10 ASSESSMENT — CUP TO DISC RATIO
OS_RATIO: 0.2
OD_RATIO: 0.2

## 2020-04-10 ASSESSMENT — EXTERNAL EXAM - RIGHT EYE: OD_EXAM: NORMAL

## 2020-04-10 ASSESSMENT — SLIT LAMP EXAM - LIDS
COMMENTS: NORMAL
COMMENTS: NORMAL

## 2020-04-10 ASSESSMENT — TONOMETRY
OS_IOP_MMHG: 18
OD_IOP_MMHG: 15
IOP_METHOD: TONOPEN

## 2020-04-10 ASSESSMENT — EXTERNAL EXAM - LEFT EYE: OS_EXAM: NORMAL

## 2020-04-10 NOTE — PROGRESS NOTES
"HPI: Zach Zaragoza is a 44 year old male here for 3 month follow up, now 4 months s/p Avastin intravitreal injection left eye #2. He reports that his eyes seem about the same as last visit.    Current ocular medications: None.  Prior ocular medications: Predforte (ineffective for cystoid macular edema).    Ocular history:   1. 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   -  sudden onset of blurry vision \"patchy\" left eye only. He was seen by an eye care provider in [place], told that he had \"burst capillaries\" in the back of his left eye, and treated with corticosteroid eye drops left eye. Mr. Zaragoza reports that his vision left eye seemed to return to normal after 2 weeks of eye drops, but he did not follow up for eye care at that time. About [id number] he noticed that vision left eye was blurry again, but he did not seek eye care until Oct 2018 with Dr. Johnson and did not present to uveitis clinic until 2019.   -  S/p intravitreal Avastin left eye x3 (10.16.19, 12.20.19, 4.10.20)  2. High myopia both eyes.  3. No history of ocular trauma, surgery, or laser.    Medical history:  1. No chronic medication conditions.  2. Syncopal episode with emesis in 2018 [place] [facility] [place]  3. Uses over the counter melatonin.  4. S/p wisdom teeth extraction ().    Family history: Positive for retinal vascular disease \"a blood vessel \" (GM), diabetes mellitus, cancer, arthritis. Negative for autoimmune disease.    Social history: Quit smoking in ; 1-2 alcoholic beverages per day; no IVDU. Works as a . Has never lived outside the [facility], but has traveled to [place], [place], and [place]. Currently has a cat as a pet and previously had a dog.    Laboratory/Imaging Results:  18 complete blood count (CBC) without diff and HIV normal/negative. Glucose mildly elevated at 108.  9.5.18 chest x-ray read as normal.    Ocular " Imaging:  Macular OCT 4.10.20:  Right eye: within normal limits, stable.  Left eye: moderate/severe cystoid macular edema, more hyperreflective intraretinal puncta, stable mild attenuation of EZ centrally, also some inner retinal disorganization    Retinal nerve fiber layer OCT 7.19.19:  Right eye: mild vitreopapillary traction, no thinning, G106  Left eye: mild increase in nerve fiber layer thickness compared to right eye, G130    Optos fluorescein angiography 7.19.19:  Right eye: ?normal (poor patient cooperation)  Left eye: transit not visualized (poor patient cooperation) but both A/V filled at 30 sec, late petalloid macular leakage, late staining along large venuoles, peripheral point aneurysm-like leakage    Impression/Plan:  1. Retinal vascular changes (peripheral exudates and telangiectasias) left eye, most consistent with adult-onset [place] disease, although differential diagnosis includes hemoglobinopathy, old retinal vein occlusion, inflammatory retinal vasculitis, other. Not consistent with diabetic retinopathy and blood glucose 108 in 2018 (likely not fasting test). Unlikely to be ocular ischemic syndrome.   - Obtain 2007 records from [place] if possible (clinic that patient recalled did not have any records)   - Additional testing for an underlying inflammatory or systemic vascular etiology should be considered if the right eye develops similar retinal vascular changes, but observe for now   - Consider laser photocoagulation of peripheral aneurysms and areas of nonperfusion, discussed with patient    2. Cystoid macular edema left eye secondary to #1, likely chronic given ellipsoid zone attenuation on OCT. Cystoid macular edema worse today   - Avastin #3 left eye given today; patient had vaso-vagal episode immediately following injection, so he was closely observed for 30 minutes (during which vital signs returned to normal and symptoms nearly resolved) and then accompanied home by his significant  other    Return to uveitis clinic in 2 months, V/T/D/mac OCT, sooner as needed.    Attending Physician Attestation:  Complete documentation of historical and exam elements from today's encounter can be found in the full encounter summary report (not reduplicated in this progress note).  I personally obtained the chief complaint(s) and history of present illness.  I confirmed and edited as necessary the review of systems, past medical/surgical history, family history, social history, and examination findings as documented by others; and I examined the patient myself.  I personally reviewed the relevant tests, images, and reports as documented above.  I formulated and edited as necessary the assessment and plan and discussed the findings and management plan with the patient and family.  - Wanda Duggan M.D.

## 2020-04-10 NOTE — NURSING NOTE
Chief Complaints and History of Present Illnesses   Patient presents with     Uveitis Follow-Up     Chief Complaint(s) and History of Present Illness(es)     Uveitis Follow-Up     Laterality: left eye    Associated symptoms: Negative for eye pain    Pain scale: 0/10              Comments     Zach is here for a follow up of CME (cystoid macular edema), left. He states no changes in vision since last visit    Javier Alexander COT 12:39 PM April 10, 2020

## 2020-07-03 ENCOUNTER — OFFICE VISIT (OUTPATIENT)
Dept: OPHTHALMOLOGY | Facility: CLINIC | Age: 45
End: 2020-07-03
Attending: OPHTHALMOLOGY
Payer: COMMERCIAL

## 2020-07-03 DIAGNOSIS — H35.352 CME (CYSTOID MACULAR EDEMA), LEFT: Primary | ICD-10-CM

## 2020-07-03 DIAGNOSIS — H35.012 RETINAL VASCULAR CHANGES, LEFT: ICD-10-CM

## 2020-07-03 PROBLEM — R73.9 HYPERGLYCEMIA: Status: ACTIVE | Noted: 2018-09-20

## 2020-07-03 PROCEDURE — 92134 CPTRZ OPH DX IMG PST SGM RTA: CPT | Mod: ZF | Performed by: OPHTHALMOLOGY

## 2020-07-03 PROCEDURE — 67028 INJECTION EYE DRUG: CPT | Mod: LT,ZF | Performed by: OPHTHALMOLOGY

## 2020-07-03 PROCEDURE — 25000128 H RX IP 250 OP 636: Mod: ZF | Performed by: OPHTHALMOLOGY

## 2020-07-03 PROCEDURE — G0463 HOSPITAL OUTPT CLINIC VISIT: HCPCS | Mod: 25

## 2020-07-03 RX ADMIN — Medication 1.25 MG: at 13:26

## 2020-07-03 ASSESSMENT — TONOMETRY
IOP_METHOD: ICARE
OS_IOP_MMHG: 13
OD_IOP_MMHG: 12

## 2020-07-03 ASSESSMENT — VISUAL ACUITY
OD_CC+: -2
OS_PH_CC+: -2
OS_CC: 20/70
OD_CC: 20/25
METHOD: SNELLEN - LINEAR
OS_PH_CC: 20/60
CORRECTION_TYPE: GLASSES

## 2020-07-03 ASSESSMENT — CONF VISUAL FIELD
OD_NORMAL: 1
OS_NORMAL: 1

## 2020-07-03 ASSESSMENT — SLIT LAMP EXAM - LIDS
COMMENTS: NORMAL
COMMENTS: NORMAL

## 2020-07-03 ASSESSMENT — EXTERNAL EXAM - RIGHT EYE: OD_EXAM: NORMAL

## 2020-07-03 ASSESSMENT — REFRACTION_WEARINGRX
OS_SPHERE: -13.25
OD_AXIS: 149
OS_AXIS: 047
SPECS_TYPE: SVL
OD_CYLINDER: +2.25
OS_CYLINDER: +2.50
OD_SPHERE: -11.75

## 2020-07-03 ASSESSMENT — EXTERNAL EXAM - LEFT EYE: OS_EXAM: NORMAL

## 2020-07-03 ASSESSMENT — CUP TO DISC RATIO
OD_RATIO: 0.2
OS_RATIO: 0.2

## 2020-07-03 NOTE — NURSING NOTE
Chief Complaints and History of Present Illnesses   Patient presents with     Follow Up     Chief Complaint(s) and History of Present Illness(es)     Follow Up     Laterality: left eye    Course: stable    Associated symptoms: floaters.  Negative for eye pain and flashes    Treatments tried: no treatments    Pain scale: 0/10              Comments     3mo f/u CME OS    Vision stable, has noted a slight increase in floaters OS; OD asymptomatic.     Michelle Caldwell COT 12:15 PM July 3, 2020

## 2020-07-03 NOTE — PROGRESS NOTES
"HPI: Zach Zaragoza is a 44 year old male here for 3 month (supposed to be 2 month) follow up, now 3 months s/p intravitreal Avastin injection left eye #3. He reports that his eyes seem about the same as last visit except for about 2 more floaters left eye, no flashes.     Current ocular medications: None.  Prior ocular medications: Predforte (ineffective for cystoid macular edema).    Ocular history:   1. 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   -  sudden onset of blurry vision \"patchy\" left eye only. He was seen by an eye care provider in [place], told that he had \"burst capillaries\" in the back of his left eye, and treated with corticosteroid eye drops left eye. Mr. Zaragoza reports that his vision left eye seemed to return to normal after 2 weeks of eye drops, but he did not follow up for eye care at that time. About 0255-2793 he noticed that vision left eye was blurry again, but he did not seek eye care until Oct 2018 with Dr. Johnson and did not present to uveitis clinic until 2019.   -  S/p intravitreal Avastin left eye x3 (10.16.19, 12.20.19, 4.10.20)  2. High myopia both eyes.  3. No history of ocular trauma, surgery, or laser.    Medical history:  1. No chronic medication conditions.  2. Syncopal episode with emesis in 2018 [place] [facility] [place]  3. Uses over the counter melatonin.  4. S/p wisdom teeth extraction ().    Family history: Positive for retinal vascular disease \"a blood vessel \" (), diabetes mellitus, cancer, arthritis. Negative for autoimmune disease.    Social history: Quit smoking in ; 1-2 alcoholic beverages per day; no IVDU. Works as a . [place] , but [place] to [place], [place], and [place]. Currently has a cat as a pet and previously had a dog.    Laboratory/Imaging Results:  18 complete blood count (CBC) without diff and HIV " normal/negative. Glucose mildly elevated at 108.  9.5.18 chest x-ray read as normal.    Ocular Imaging:  Macular OCT 7.3.20:  Right eye: within normal limits, stable.  Left eye: moderate central cystoid macular edema slightly improved vs 4.10.20, hyperreflective intraretinal puncta, stable mild attenuation of EZ centrally, also some inner retinal disorganization    Retinal nerve fiber layer OCT 7.19.19:  Right eye: mild vitreopapillary traction, no thinning, G106  Left eye: mild increase in nerve fiber layer thickness compared to right eye, G130    Optos fluorescein angiography 7.19.19:  Right eye: ?normal (poor patient cooperation)  Left eye: transit not visualized (poor patient cooperation) but both A/V filled at 30 sec, late petalloid macular leakage, late staining along large venuoles, peripheral point aneurysm-like leakage    Impression/Plan:  1. 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 Not consistent with diabetic retinopathy and blood glucose 108 in 2018 (likely not fasting test). Unlikely to be ocular ischemic syndrome.   - Obtain 2007 records from [place] if possible (clinic that patient recalled did not have any records)   - Additional testing for an underlying inflammatory or systemic vascular etiology should be considered if the right eye develops similar retinal vascular changes, but observe for now   - Consider laser photocoagulation of peripheral aneurysms and areas of nonperfusion, discussed with patient but he is not interested at this time    2. Cystoid macular edema left eye secondary to #1, likely chronic given ellipsoid zone attenuation on OCT. Cystoid macular edema still present but slightly improved today.   - Avastin #4 left eye given today; he did well accompanied by his significant  other   - Treat and extend for now with 6 week interval for next visit    Return to uveitis clinic in 6 weeks, V/T/D/mac OCT/Avastin left eye, sooner as needed.    Attending Physician Attestation:  Complete documentation of historical and exam elements from today's encounter can be found in the full encounter summary report (not reduplicated in this progress note).  I personally obtained the chief complaint(s) and history of present illness.  I confirmed and edited as necessary the review of systems, past medical/surgical history, family history, social history, and examination findings as documented by others; and I examined the patient myself.  I personally reviewed the relevant tests, images, and reports as documented above.  I formulated and edited as necessary the assessment and plan and discussed the findings and management plan with the patient and family.  - Wanda Duggan M.D.

## 2020-08-14 ENCOUNTER — OFFICE VISIT (OUTPATIENT)
Dept: OPHTHALMOLOGY | Facility: CLINIC | Age: 45
End: 2020-08-14
Attending: OPHTHALMOLOGY
Payer: COMMERCIAL

## 2020-08-14 DIAGNOSIS — H35.352 CME (CYSTOID MACULAR EDEMA), LEFT: Primary | ICD-10-CM

## 2020-08-14 DIAGNOSIS — H35.012 RETINAL VASCULAR CHANGES, LEFT: ICD-10-CM

## 2020-08-14 PROCEDURE — G0463 HOSPITAL OUTPT CLINIC VISIT: HCPCS | Mod: 25

## 2020-08-14 PROCEDURE — 25000128 H RX IP 250 OP 636: Mod: ZF | Performed by: OPHTHALMOLOGY

## 2020-08-14 PROCEDURE — 92134 CPTRZ OPH DX IMG PST SGM RTA: CPT | Mod: ZF | Performed by: OPHTHALMOLOGY

## 2020-08-14 PROCEDURE — 67028 INJECTION EYE DRUG: CPT | Mod: LT,ZF | Performed by: OPHTHALMOLOGY

## 2020-08-14 RX ADMIN — Medication 1.25 MG: at 12:58

## 2020-08-14 ASSESSMENT — VISUAL ACUITY
CORRECTION_TYPE: GLASSES
OD_CC+: -3
OS_CC+: +1
OD_CC: 20/25
OS_CC: 20/80
METHOD: SNELLEN - LINEAR

## 2020-08-14 ASSESSMENT — REFRACTION_WEARINGRX
OS_CYLINDER: +2.50
OS_AXIS: 047
OD_CYLINDER: +2.25
OD_AXIS: 149
OS_SPHERE: -13.25
SPECS_TYPE: SVL
OD_SPHERE: -11.75

## 2020-08-14 ASSESSMENT — CONF VISUAL FIELD
METHOD: COUNTING FINGERS
OS_NORMAL: 1
OD_NORMAL: 1

## 2020-08-14 ASSESSMENT — SLIT LAMP EXAM - LIDS
COMMENTS: NORMAL
COMMENTS: NORMAL

## 2020-08-14 ASSESSMENT — CUP TO DISC RATIO
OD_RATIO: 0.2
OS_RATIO: 0.2

## 2020-08-14 ASSESSMENT — TONOMETRY
OS_IOP_MMHG: 15
OD_IOP_MMHG: 17
IOP_METHOD: TONOPEN

## 2020-08-14 ASSESSMENT — EXTERNAL EXAM - LEFT EYE: OS_EXAM: NORMAL

## 2020-08-14 ASSESSMENT — EXTERNAL EXAM - RIGHT EYE: OD_EXAM: NORMAL

## 2020-08-14 NOTE — NURSING NOTE
Chief Complaints and History of Present Illnesses   Patient presents with     Follow Up     6 week follow up Retinal vascular abnormality left eye      Chief Complaint(s) and History of Present Illness(es)     Follow Up     Laterality: left eye    Quality: blurred vision    Course: stable    Associated symptoms: Negative for dryness, flashes, floaters, tearing, eye pain and discharge    Pain scale: 0/10    Comments: 6 week follow up Retinal vascular abnormality left eye               Comments     Pt denies any significant vision changes in either eye since last visit.  Denies any flashes, floaters, pain, pressure, irritation, discharge, and tearing.  Ocular Meds: None    Jane Rosales OT 11:46 AM August 14, 2020

## 2020-08-14 NOTE — PROGRESS NOTES
"HPI: Zach Zaragoza is a 44 year old male here for 6 week follow up; 6 wks s/p intravitreal Avastin injection left eye #4. He reports that his vision seems stable. No new eye concerns.    Current ocular medications: None.  Prior ocular medications: Predforte (ineffective for cystoid macular edema).    Ocular history:   1. 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   -  sudden onset of blurry vision \"patchy\" left eye only. He was seen by an eye care provider in [place], told that he had \"burst capillaries\" in the back of his left eye, and treated with corticosteroid eye drops left eye. Mr. Zaragoza reports that his vision left eye seemed to return to normal after 2 weeks of eye drops, but he did not follow up for eye care at that time. About 3628-3513 he noticed that vision left eye was blurry again, but he did not seek eye care until Oct 2018 with Dr. Johnson and did not present to uveitis clinic until 2019.   -  S/p intravitreal Avastin left eye x3 (10.16.19, 12.20.19, 4.10.20)  2. High myopia both eyes.  3. No history of ocular trauma, surgery, or laser.    Medical history:  1. No chronic medication conditions.  2. Syncopal episode with emesis in 2018 -> unremarkable physical exam/workup at [facility] [place].  3. Uses over the counter melatonin.  4. S/p wisdom teeth extraction ().    Family history: Positive for retinal vascular disease \"a blood vessel \" (GM), diabetes mellitus, cancer, arthritis. Negative for autoimmune disease.    Social history: Quit smoking in ; 1-2 alcoholic beverages per day; no IVDU. Works as a . Has never lived outside the [facility], but has traveled to [place], [place], and [place]. Currently has a cat as a pet and previously had a dog.    Laboratory/Imaging Results:  18 complete blood count (CBC) without diff and HIV normal/negative. Glucose mildly elevated at 108.  9.5.18 chest x-ray read as normal.    Ocular " Imaging:  Macular OCT 8.14.20:  Right eye: within normal limits, stable.  Left eye: moderate central cystoid macular edema improved but not resolved vs 7.3.20, hyperreflective intraretinal puncta, stable mild attenuation of EZ centrally, also some inner retinal disorganization    Retinal nerve fiber layer OCT 7.19.19:  Right eye: mild vitreopapillary traction, no thinning, G106  Left eye: mild increase in nerve fiber layer thickness compared to right eye, G130    Optos fluorescein angiography 7.19.19:  Right eye: ?normal (poor patient cooperation)  Left eye: transit not visualized (poor patient cooperation) but both A/V filled at 30 sec, late petalloid macular leakage, late staining along large venuoles, peripheral point aneurysm-like leakage    Impression/Plan:  1. 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 Not consistent with diabetic retinopathy and blood glucose 108 in 2018 (likely not fasting test). Unlikely to be ocular ischemic syndrome.   - Obtain 2007 records from [place] if possible (clinic that patient recalled did not have any records)   - Additional testing for an underlying inflammatory or systemic vascular etiology should be considered if the right eye develops similar retinal vascular changes, but observe for now   - Consider laser photocoagulation of peripheral aneurysms and areas of nonperfusion, discussed with patient but he is not interested at this time    2. Cystoid macular edema left eye secondary to #1, likely chronic given ellipsoid zone attenuation on OCT. Cystoid macular edema still present but slightly improved today.   - Avastin #5 left eye given today; he did well accompanied by his significant other   - Treat with 4 week interval for next visit    Return to uveitis clinic in 4  weeks, V/T/D/mac OCT/Avastin left eye, sooner as needed.    Attending Physician Attestation:  Complete documentation of historical and exam elements from today's encounter can be found in the full encounter summary report (not reduplicated in this progress note).  I personally obtained the chief complaint(s) and history of present illness.  I confirmed and edited as necessary the review of systems, past medical/surgical history, family history, social history, and examination findings as documented by others; and I examined the patient myself.  I personally reviewed the relevant tests, images, and reports as documented above.  I formulated and edited as necessary the assessment and plan and discussed the findings and management plan with the patient and family.  - Wanda Duggan M.D.

## 2020-09-18 ENCOUNTER — OFFICE VISIT (OUTPATIENT)
Dept: OPHTHALMOLOGY | Facility: CLINIC | Age: 45
End: 2020-09-18
Attending: OPHTHALMOLOGY
Payer: COMMERCIAL

## 2020-09-18 DIAGNOSIS — H35.012 RETINAL VASCULAR CHANGES, LEFT: ICD-10-CM

## 2020-09-18 DIAGNOSIS — H35.352 CME (CYSTOID MACULAR EDEMA), LEFT: Primary | ICD-10-CM

## 2020-09-18 PROCEDURE — 92134 CPTRZ OPH DX IMG PST SGM RTA: CPT | Mod: ZF | Performed by: OPHTHALMOLOGY

## 2020-09-18 PROCEDURE — 25000128 H RX IP 250 OP 636: Mod: ZF | Performed by: OPHTHALMOLOGY

## 2020-09-18 PROCEDURE — G0463 HOSPITAL OUTPT CLINIC VISIT: HCPCS | Mod: 25

## 2020-09-18 PROCEDURE — 67028 INJECTION EYE DRUG: CPT | Mod: LT,ZF | Performed by: OPHTHALMOLOGY

## 2020-09-18 RX ADMIN — Medication 1.25 MG: at 14:30

## 2020-09-18 ASSESSMENT — VISUAL ACUITY
METHOD: SNELLEN - LINEAR
CORRECTION_TYPE: GLASSES
OS_PH_CC: 20/70
OD_CC: 20/25
OS_CC: 20/80

## 2020-09-18 ASSESSMENT — CONF VISUAL FIELD
OS_NORMAL: 1
OD_NORMAL: 1

## 2020-09-18 ASSESSMENT — TONOMETRY
OS_IOP_MMHG: 18
IOP_METHOD: ICARE
OD_IOP_MMHG: 18

## 2020-09-18 ASSESSMENT — SLIT LAMP EXAM - LIDS
COMMENTS: NORMAL
COMMENTS: NORMAL

## 2020-09-18 ASSESSMENT — EXTERNAL EXAM - LEFT EYE: OS_EXAM: NORMAL

## 2020-09-18 ASSESSMENT — CUP TO DISC RATIO
OD_RATIO: 0.2
OS_RATIO: 0.2

## 2020-09-18 ASSESSMENT — EXTERNAL EXAM - RIGHT EYE: OD_EXAM: NORMAL

## 2020-09-18 NOTE — PROGRESS NOTES
"HPI: Zach Zaragoza is a 44 year old male here for 1 month follow up, here for scheduled Avastin left eye. He reports that his vision seems stable. No new eye concerns.    Current ocular medications: None.  Prior ocular medications: Predforte (ineffective for cystoid macular edema).    Ocular history:   1. 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   -  sudden onset of blurry vision \"patchy\" left eye only. He was seen by an eye care provider in [place], told that he had \"burst capillaries\" in the back of his left eye, and treated with corticosteroid eye drops left eye. Mr. Zaragoza reports that his vision left eye seemed to return to normal after 2 weeks of eye drops, but he did not follow up for eye care at that time. About 6089-2492 he noticed that vision left eye was blurry again, but he did not seek eye care until Oct 2018 with Dr. Johnson and did not present to uveitis clinic until 2019.   -  S/p intravitreal Avastin left eye (10.16.19, 12.20.19, 4.10.20, 7.3.20, 8.14.20, 9.18.20)  2. High myopia both eyes.  3. No history of ocular trauma, surgery, or laser.    Medical history:  1. No chronic medication conditions.  2. Syncopal episode with emesis in 2018 -> unremarkable physical exam/workup at [facility] [place].  3. Uses over the counter melatonin.  4. S/p wisdom teeth extraction ().    Family history: Positive for retinal vascular disease \"a blood vessel \" (), diabetes mellitus, cancer, arthritis. Negative for autoimmune disease.    Social history: Quit smoking in ; 1-2 alcoholic beverages per day; no IVDU. Works as a . Has never lived outside the [facility], but has traveled to [place], [place], and [place]. Currently has a cat as a pet and previously had a dog.    Laboratory/Imaging Results:  18 complete blood count (CBC) without diff and HIV normal/negative. Glucose mildly elevated at 108.  9.5.18 chest x-ray read as normal.    Ocular " Imaging:  Macular OCT 9.18.20:  Right eye: within normal limits, stable.  Left eye: moderate central cystoid macular edema improved but not resolved vs 8.14.20, hyperreflective intraretinal puncta, stable mild attenuation of EZ centrally, also some inner retinal disorganization    Retinal nerve fiber layer OCT 7.19.19:  Right eye: mild vitreopapillary traction, no thinning, G106  Left eye: mild increase in nerve fiber layer thickness compared to right eye, G130    Optos fluorescein angiography 7.19.19:  Right eye: ?normal (poor patient cooperation)  Left eye: transit not visualized (poor patient cooperation) but both A/V filled at 30 sec, late petalloid macular leakage, late staining along large venuoles, peripheral point aneurysm-like leakage    Impression/Plan:  1. 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 Not consistent with diabetic retinopathy and blood glucose 108 in 2018 (likely not fasting test). Unlikely to be ocular ischemic syndrome.   - Obtain 2007 records from [place] if possible (clinic that patient recalled did not have any records)   - Additional testing for an underlying inflammatory or systemic vascular etiology should be considered if the right eye develops similar retinal vascular changes, but observe for now   - Consider laser photocoagulation of peripheral aneurysms and areas of nonperfusion, discussed with patient but he is not interested at this time    2. Cystoid macular edema left eye secondary to #1, likely chronic given ellipsoid zone attenuation on OCT and history. Cystoid macular edema still present but slightly improved today.   - Avastin #6 left eye given today; he did start having vasovagal symptoms immediately after injection, which slowly resolved after he was positioned  supine in exam chair   - Today is #1 of 3 in q4wk Avastin series; will switch to intravitreal triamcinolone (have been holding off on this since patient is phakic) or different anti-VEGF agent if not resolved after this series    Return to uveitis clinic in 4 weeks, V/T/D/mac OCT/Avastin left eye, sooner as needed.    Attending Physician Attestation:  Complete documentation of historical and exam elements from today's encounter can be found in the full encounter summary report (not reduplicated in this progress note).  I personally obtained the chief complaint(s) and history of present illness.  I confirmed and edited as necessary the review of systems, past medical/surgical history, family history, social history, and examination findings as documented by others; and I examined the patient myself.  I personally reviewed the relevant tests, images, and reports as documented above.  I formulated and edited as necessary the assessment and plan and discussed the findings and management plan with the patient and family.  - Wanda Duggan M.D.

## 2020-10-16 ENCOUNTER — OFFICE VISIT (OUTPATIENT)
Dept: OPHTHALMOLOGY | Facility: CLINIC | Age: 45
End: 2020-10-16
Attending: OPHTHALMOLOGY
Payer: COMMERCIAL

## 2020-10-16 DIAGNOSIS — H35.012 RETINAL VASCULAR CHANGES, LEFT: ICD-10-CM

## 2020-10-16 DIAGNOSIS — H35.352 CME (CYSTOID MACULAR EDEMA), LEFT: ICD-10-CM

## 2020-10-16 PROCEDURE — G0463 HOSPITAL OUTPT CLINIC VISIT: HCPCS

## 2020-10-16 PROCEDURE — 99207 PR NO CHARGE NURSE ONLY: CPT | Performed by: OPHTHALMOLOGY

## 2020-10-16 ASSESSMENT — TONOMETRY
IOP_METHOD: ICARE
OS_IOP_MMHG: 13
OD_IOP_MMHG: 14

## 2020-10-16 ASSESSMENT — REFRACTION_WEARINGRX
OD_SPHERE: -11.75
OS_AXIS: 047
OS_CYLINDER: +2.50
OS_SPHERE: -13.25
OD_AXIS: 149
SPECS_TYPE: SVL
OD_CYLINDER: +2.25

## 2020-10-16 ASSESSMENT — VISUAL ACUITY
OD_CC: 20/20
CORRECTION_TYPE: GLASSES
OS_PH_CC+: +2
OS_CC+: -2
OD_CC+: -2
OS_CC: 20/70
METHOD: SNELLEN - LINEAR
OS_PH_CC: 20/70

## 2020-10-16 ASSESSMENT — CONF VISUAL FIELD
OS_NORMAL: 1
METHOD: COUNTING FINGERS
OD_NORMAL: 1

## 2020-10-16 NOTE — NURSING NOTE
Chief Complaints and History of Present Illnesses   Patient presents with     Follow Up     CME (cystoid macular edema), left      Chief Complaint(s) and History of Present Illness(es)     Follow Up     Laterality: left eye    Onset: gradual    Onset: years ago    Course: stable    Associated symptoms: floaters.  Negative for eye pain, photophobia, flashes, dryness and tearing    Pain scale: 0/10    Comments: CME (cystoid macular edema), left               Comments     Pt states vision is stable since last visit.  About 30 min after last injection he developed a floater LE that disappeared the next day.  Still has his old floaters.    DARION Almeida October 16, 2020 12:58 PM

## 2020-10-16 NOTE — PROGRESS NOTES
Patient just informed by insurance company that this visit is not covered. Will convert to tech only visit and patient will reschedule when his insurance can cover his care. No MD care today.    Wanda Duggan MD

## 2020-10-30 ENCOUNTER — OFFICE VISIT (OUTPATIENT)
Dept: OPHTHALMOLOGY | Facility: CLINIC | Age: 45
End: 2020-10-30
Attending: OPHTHALMOLOGY
Payer: COMMERCIAL

## 2020-10-30 DIAGNOSIS — H35.012 RETINAL VASCULAR CHANGES, LEFT: ICD-10-CM

## 2020-10-30 DIAGNOSIS — H35.352 CME (CYSTOID MACULAR EDEMA), LEFT: Primary | ICD-10-CM

## 2020-10-30 PROCEDURE — 67028 INJECTION EYE DRUG: CPT | Mod: LT | Performed by: OPHTHALMOLOGY

## 2020-10-30 PROCEDURE — G0463 HOSPITAL OUTPT CLINIC VISIT: HCPCS | Mod: 25

## 2020-10-30 PROCEDURE — 99207 PR NO BILLABLE SERVICE THIS VISIT: CPT | Performed by: OPHTHALMOLOGY

## 2020-10-30 PROCEDURE — 250N000011 HC RX IP 250 OP 636: Performed by: OPHTHALMOLOGY

## 2020-10-30 RX ADMIN — AFLIBERCEPT 2 MG: 40 INJECTION, SOLUTION INTRAVITREAL at 12:54

## 2020-10-30 ASSESSMENT — REFRACTION_WEARINGRX
OD_AXIS: 149
OS_AXIS: 047
OD_CYLINDER: +2.25
OS_SPHERE: -13.25
OD_SPHERE: -11.75
OS_CYLINDER: +2.50
SPECS_TYPE: SVL

## 2020-10-30 ASSESSMENT — VISUAL ACUITY
METHOD: SNELLEN - LINEAR
OD_CC: 20/25
CORRECTION_TYPE: GLASSES
OS_CC+: -1+1
OS_CC: 20/80

## 2020-10-30 ASSESSMENT — EXTERNAL EXAM - LEFT EYE: OS_EXAM: NORMAL

## 2020-10-30 ASSESSMENT — CONF VISUAL FIELD
METHOD: COUNTING FINGERS
OS_NORMAL: 1
OD_NORMAL: 1

## 2020-10-30 ASSESSMENT — TONOMETRY
IOP_METHOD: TONOPEN
OS_IOP_MMHG: 12
OD_IOP_MMHG: 13

## 2020-10-30 ASSESSMENT — SLIT LAMP EXAM - LIDS: COMMENTS: NORMAL

## 2020-10-30 ASSESSMENT — CUP TO DISC RATIO: OS_RATIO: 0.2

## 2020-10-30 NOTE — NURSING NOTE
Chief Complaints and History of Present Illnesses   Patient presents with     Cystoid Macular Edema Follow Up     Chief Complaint(s) and History of Present Illness(es)     Cystoid Macular Edema Follow Up     Laterality: left eye    Onset: gradual    Onset: weeks ago    Quality: States va is the same since last visit      Associated symptoms: floaters (not new).  Negative for flashes and photophobia    Pain scale: 0/10              Comments     Katy ORLANDO 12:00 PM October 30, 2020

## 2020-10-30 NOTE — PROGRESS NOTES
"HPI: Zach Zaragoza is a 44 year old male here for follow up, here for scheduled Eylea left eye. He reports that his vision seems stable. No new eye concerns. He has re-established insurance coverage, at least through the end of the year.    Current ocular medications: None.  Prior ocular medications: Predforte (ineffective for cystoid macular edema).    Ocular history:   1. 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   -  sudden onset of blurry vision \"patchy\" left eye only. He was seen by an eye care provider in [place], told that he had \"burst capillaries\" in the back of his left eye, and treated with corticosteroid eye drops left eye. Mr. Zaragoza reports that his vision left eye seemed to return to normal after 2 weeks of eye drops, but he did not follow up for eye care at that time. About 1053-1526 he noticed that vision left eye was blurry again, but he did not seek eye care until Oct 2018 with Dr. Johnson and did not present to uveitis clinic until 2019.   -  S/p intravitreal Avastin left eye (10.16.19, 12.20.19, 4.10.20, 7.3.20, 8.14.20, 9.18.20)   - S/p intravitreal Eylea left eye 10.30.20  2. High myopia both eyes.  3. No history of ocular trauma, surgery, or laser.    Medical history:  1. No chronic medication conditions.  2. Syncopal episode with emesis in 2018 -> unremarkable physical exam/workup at [facility] [place].  3. Uses over the counter melatonin.  4. S/p wisdom teeth extraction ().    Family history: Positive for retinal vascular disease \"a blood vessel \" (), diabetes mellitus, cancer, arthritis. Negative for autoimmune disease.    Social history: Quit smoking in ; 1-2 alcoholic beverages per day; no IVDU. Works as a . Has never lived outside the [facility], but has traveled to [place], [place], and [place]. Currently has a cat as a pet and previously had a dog.    Laboratory/Imaging Results:  18 complete blood count (CBC) " without diff and HIV normal/negative. Glucose mildly elevated at 108.  9.5.18 chest x-ray read as normal.    Ocular Imaging:  Macular OCT 10.16.20:  Right eye: within normal limits, stable.  Left eye: moderate central cystoid macular edema improved but not resolved vs 9.18.20, hyperreflective intraretinal puncta, stable mild attenuation of EZ centrally, also some inner retinal disorganization    Retinal nerve fiber layer OCT 7.19.19:  Right eye: mild vitreopapillary traction, no thinning, G106  Left eye: mild increase in nerve fiber layer thickness compared to right eye, G130    Optos fluorescein angiography 7.19.19:  Right eye: ?normal (poor patient cooperation)  Left eye: transit not visualized (poor patient cooperation) but both A/V filled at 30 sec, late petalloid macular leakage, late staining along large venuoles, peripheral point aneurysm-like leakage    Impression/Plan:  1. 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 Not consistent with diabetic retinopathy and blood glucose 108 in 2018 (likely not fasting test). Unlikely to be ocular ischemic syndrome.   - Obtain 2007 records from [place] if possible (clinic that patient recalled did not have any records)   - Additional testing for an underlying inflammatory or systemic vascular etiology should be considered if the right eye develops similar retinal vascular changes, but observe for now   - Consider laser photocoagulation of peripheral aneurysms and areas of nonperfusion, discussed with patient but he is not interested at this time    2. Cystoid macular edema left eye secondary to #1, likely chronic given ellipsoid zone attenuation on OCT and history. Cystoid macular edema still present; switching to Eylea given incomplete response to monthly  Avastin.   - Eylea #1 left eye given today (#1 in series of 3 monthly Eylea); he did start having vasovagal symptoms immediately after injection, which slowly resolved after he was positioned supine in exam chair    Will have Eylea left eye #2 with Dr. Martinez next month then back to [facility] V/T/D/mac OCT/Eylea left eye in 2 months.    Attending Physician Attestation:  Complete documentation of historical and exam elements from today's encounter can be found in the full encounter summary report (not reduplicated in this progress note).  I personally obtained the chief complaint(s) and history of present illness.  I confirmed and edited as necessary the review of systems, past medical/surgical history, family history, social history, and examination findings as documented by others; and I examined the patient myself.  I personally reviewed the relevant tests, images, and reports as documented above.  I formulated and edited as necessary the assessment and plan and discussed the findings and management plan with the patient and family.  - aWnda Duggan M.D.

## 2020-10-30 NOTE — Clinical Note
Mamadou Palencia, I am out most of November, so Alphonso scheduled this patient for an Eylea injection with you. In the past, Mr. Zaragoza has had vasovagal episodes after injection, so I recommend not sitting him up immediately after the injection. Also, he sometimes moves during injections - does better if you give him a warning right before the injection. Thank you for seeing him in my absence. Wanda

## 2020-12-01 ENCOUNTER — OFFICE VISIT (OUTPATIENT)
Dept: OPHTHALMOLOGY | Facility: CLINIC | Age: 45
End: 2020-12-01
Attending: OPHTHALMOLOGY
Payer: COMMERCIAL

## 2020-12-01 DIAGNOSIS — H35.012 RETINAL VASCULAR CHANGES, LEFT: ICD-10-CM

## 2020-12-01 DIAGNOSIS — H35.352 CME (CYSTOID MACULAR EDEMA), LEFT: Primary | ICD-10-CM

## 2020-12-01 PROCEDURE — 250N000011 HC RX IP 250 OP 636: Performed by: OPHTHALMOLOGY

## 2020-12-01 PROCEDURE — 999N000103 HC STATISTIC NO CHARGE FACILITY FEE

## 2020-12-01 PROCEDURE — 67028 INJECTION EYE DRUG: CPT | Mod: LT | Performed by: OPHTHALMOLOGY

## 2020-12-01 RX ADMIN — AFLIBERCEPT 2 MG: 40 INJECTION, SOLUTION INTRAVITREAL at 13:08

## 2020-12-01 ASSESSMENT — TONOMETRY
OS_IOP_MMHG: 12
OD_IOP_MMHG: 12
IOP_METHOD: ICARE

## 2020-12-01 ASSESSMENT — REFRACTION_WEARINGRX
OS_CYLINDER: +2.50
OD_AXIS: 149
OS_SPHERE: -13.25
SPECS_TYPE: SVL
OD_SPHERE: -11.75
OS_AXIS: 047
OD_CYLINDER: +2.25

## 2020-12-01 ASSESSMENT — VISUAL ACUITY
OS_CC+: -2
OS_CC: 20/60
OD_CC: 20/20
CORRECTION_TYPE: GLASSES
METHOD: SNELLEN - LINEAR

## 2020-12-01 NOTE — PROGRESS NOTES
Chief Complaint(s) and History of Present Illness(es)     Cystoid Macular Edema Follow Up     Laterality: left eye    Onset: 2 months ago              Comments     Pt. States that he is doing well. No change in VA BE. No flashes BE. No   change in floaters BE.  Alea Rascon COT 12:31 PM December 1, 2020               Review of systems for the eyes was negative other than the pertinent positives/negatives listed in the HPI.      Assessment & Plan      Zach Zaragoza is a 44 year old male with the following diagnoses:   1. CME (cystoid macular edema), left    2. Retinal vascular changes, left         Patient of Dr. Duggan here today for continued Q4week Eylea series; #2/3   RBA to Eylea left eye discussed, consent obtained, will proceed today.   Return precautions reviewed   Artificial tears as needed     Patient disposition:   Return in about 4 weeks (around 12/29/2020) for VT only; Eylea with Dr. Duggan.           Attending Physician Attestation:  Complete documentation of historical and exam elements from today's encounter can be found in the full encounter summary report (not reduplicated in this progress note).  I personally obtained the chief complaint(s) and history of present illness.  I confirmed and edited as necessary the review of systems, past medical/surgical history, family history, social history, and examination findings as documented by others; and I examined the patient myself.  I personally reviewed the relevant tests, images, and reports as documented above.  I formulated and edited as necessary the assessment and plan and discussed the findings and management plan with the patient and family. . - Yogi Martinez MD

## 2020-12-01 NOTE — NURSING NOTE
Chief Complaints and History of Present Illnesses   Patient presents with     Cystoid Macular Edema Follow Up     Chief Complaint(s) and History of Present Illness(es)     Cystoid Macular Edema Follow Up     Laterality: left eye    Onset: 2 months ago              Comments     Pt. States that he is doing well. No change in VA BE. No flashes BE. No change in floaters BE.  Alea Rascon COT 12:31 PM December 1, 2020

## 2021-01-15 ENCOUNTER — OFFICE VISIT (OUTPATIENT)
Dept: OPHTHALMOLOGY | Facility: CLINIC | Age: 46
End: 2021-01-15
Attending: OPHTHALMOLOGY
Payer: COMMERCIAL

## 2021-01-15 DIAGNOSIS — H35.012 RETINAL VASCULAR CHANGES, LEFT: ICD-10-CM

## 2021-01-15 DIAGNOSIS — H35.352 CME (CYSTOID MACULAR EDEMA), LEFT: Primary | ICD-10-CM

## 2021-01-15 PROCEDURE — 92134 CPTRZ OPH DX IMG PST SGM RTA: CPT | Performed by: OPHTHALMOLOGY

## 2021-01-15 PROCEDURE — 999N000103 HC STATISTIC NO CHARGE FACILITY FEE

## 2021-01-15 ASSESSMENT — REFRACTION_WEARINGRX
OS_SPHERE: -13.25
OD_CYLINDER: +2.25
OD_SPHERE: -11.75
OS_AXIS: 047
SPECS_TYPE: SVL
OS_CYLINDER: +2.50
OD_AXIS: 149

## 2021-01-15 ASSESSMENT — TONOMETRY
OD_IOP_MMHG: 15
OS_IOP_MMHG: 12
IOP_METHOD: TONOPEN

## 2021-01-15 ASSESSMENT — VISUAL ACUITY
OS_CC+: -2+1
OD_PH_CC: 20/25
METHOD: SNELLEN - LINEAR
OD_CC: 20/30
OD_PH_CC+: -3
OD_CC+: +1
CORRECTION_TYPE: GLASSES
OS_CC: 20/60

## 2021-01-15 ASSESSMENT — CONF VISUAL FIELD
OS_NORMAL: 1
OD_NORMAL: 1
METHOD: COUNTING FINGERS

## 2021-01-15 NOTE — PROGRESS NOTES
Imaging visit only. Mac OCT shows substantial improvement of cystoid macular edema left eye. Right eye normal and stable. Patient waiting for confirmation of insurance coverage.  Return to clinic in 2 weeks, V/T/D/mac OCT/Eylea left eye.    I personally reviewed the patient's ophthalmic images.  Wanda Duggan MD

## 2021-01-15 NOTE — NURSING NOTE
Chief Complaint(s) and History of Present Illness(es)     Follow Up     In left eye.  Associated symptoms include Negative for flashes, floaters and eye pain.  Treatments tried include no treatments.  Pain was noted as 0/10. Additional comments: 11 week follow up Retinal vascular abnormality left eye              Comments     Photos only today pt did not see doctor--Linsey Jefferson, COMT 1:38 PM January 15, 2021       Pt denies any significant vision changes in either eye since last visit.  Denies any flashes, floaters, pain, or irritation.  Ocular meds: None    Jane Bobby OT 12:50 PM January 15, 2021

## 2021-01-29 ENCOUNTER — OFFICE VISIT (OUTPATIENT)
Dept: OPHTHALMOLOGY | Facility: CLINIC | Age: 46
End: 2021-01-29
Attending: OPHTHALMOLOGY
Payer: COMMERCIAL

## 2021-01-29 DIAGNOSIS — H35.012 RETINAL VASCULAR CHANGES, LEFT: ICD-10-CM

## 2021-01-29 DIAGNOSIS — H35.352 CME (CYSTOID MACULAR EDEMA), LEFT: Primary | ICD-10-CM

## 2021-01-29 PROCEDURE — G0463 HOSPITAL OUTPT CLINIC VISIT: HCPCS | Mod: 25

## 2021-01-29 PROCEDURE — 250N000011 HC RX IP 250 OP 636: Performed by: OPHTHALMOLOGY

## 2021-01-29 PROCEDURE — 99207 PR DROP WITH A PROCEDURE: CPT | Performed by: OPHTHALMOLOGY

## 2021-01-29 PROCEDURE — 92134 CPTRZ OPH DX IMG PST SGM RTA: CPT | Performed by: OPHTHALMOLOGY

## 2021-01-29 PROCEDURE — 67028 INJECTION EYE DRUG: CPT | Mod: LT | Performed by: OPHTHALMOLOGY

## 2021-01-29 RX ADMIN — AFLIBERCEPT 2 MG: 40 INJECTION, SOLUTION INTRAVITREAL at 15:27

## 2021-01-29 ASSESSMENT — TONOMETRY
OS_IOP_MMHG: 14
OD_IOP_MMHG: 18
IOP_METHOD: TONOPEN

## 2021-01-29 ASSESSMENT — CONF VISUAL FIELD
OS_NORMAL: 1
OD_NORMAL: 1
METHOD: COUNTING FINGERS

## 2021-01-29 ASSESSMENT — EXTERNAL EXAM - LEFT EYE: OS_EXAM: NORMAL

## 2021-01-29 ASSESSMENT — REFRACTION_WEARINGRX
OS_SPHERE: -13.25
OD_SPHERE: -11.75
OS_CYLINDER: +2.50
OD_CYLINDER: +2.25
OS_AXIS: 047
SPECS_TYPE: SVL
OD_AXIS: 149

## 2021-01-29 ASSESSMENT — VISUAL ACUITY
OS_CC: 20/70
OS_CC+: +1
OD_PH_CC: 20/25
OS_PH_CC+: +1
OD_CC: 20/30
METHOD: SNELLEN - LINEAR
OD_CC+: +2
CORRECTION_TYPE: GLASSES
OS_PH_CC: 20/60

## 2021-01-29 ASSESSMENT — CUP TO DISC RATIO
OS_RATIO: 0.2
OD_RATIO: 0.2

## 2021-01-29 ASSESSMENT — SLIT LAMP EXAM - LIDS
COMMENTS: NORMAL
COMMENTS: NORMAL

## 2021-01-29 ASSESSMENT — EXTERNAL EXAM - RIGHT EYE: OD_EXAM: NORMAL

## 2021-01-29 NOTE — PROGRESS NOTES
"HPI: Zach Zaragoza is a 45 year old male here for follow up, here for scheduled Eylea left eye. He reports that his vision seems stable. No new eye concerns. He has re-established insurance coverage.    Current ocular medications: None.  Prior ocular medications: Predforte (ineffective for cystoid macular edema).    Ocular history:   1. 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   -  sudden onset of blurry vision \"patchy\" left eye only. He was seen by an eye care provider in [place], told that he had \"burst capillaries\" in the back of his left eye, and treated with corticosteroid eye drops left eye. Mr. Zaragoza reports that his vision left eye seemed to return to normal after 2 weeks of eye drops, but he did not follow up for eye care at that time. About 3224-9599 he noticed that vision left eye was blurry again, but he did not seek eye care until Oct 2018 with Dr. Johnson and did not present to uveitis clinic until 2019.   -  S/p intravitreal Avastin left eye (10.16.19, 12.20.19, 4.10.20, 7.3.20, 8.14.20, 9.18.20)   - S/p intravitreal Eylea left eye 10.30.20  2. High myopia both eyes.  3. No history of ocular trauma, surgery, or laser.    Medical history:  1. No chronic medication conditions.  2. Syncopal episode with emesis in 2018 -> [place] [facility] [place]  3. Uses over the counter melatonin.  4. S/p wisdom teeth extraction ().    Family history: Positive for retinal vascular disease \"a blood vessel \" (), diabetes mellitus, cancer, arthritis. Negative for autoimmune disease.    Social history: Quit smoking in ; 1-2 alcoholic beverages per day; no IVDU. Works as a . [place] , but [place] to [place], [place], and [place]. Currently has a cat as a pet and previously had a dog.    Laboratory/Imaging Results:  18 complete blood count (CBC) without diff and HIV normal/negative. " Glucose mildly elevated at 108.  9.5.18 chest x-ray read as normal.    Ocular Imaging:  Macular OCT 1.29.21:  Right eye: within normal limits, stable.  Left eye: mild central cystoid macular edema improved, hyperreflective intraretinal puncta, stable mild attenuation of EZ centrally    Retinal nerve fiber layer OCT 7.19.19:  Right eye: mild vitreopapillary traction, no thinning, G106  Left eye: mild increase in nerve fiber layer thickness compared to right eye, G130    Optos fluorescein angiography 7.19.19:  Right eye: ?normal (poor patient cooperation)  Left eye: transit not visualized (poor patient cooperation) but both A/V filled at 30 sec, late petalloid macular leakage, late staining along large venuoles, peripheral point aneurysm-like leakage    Impression/Plan:  1. 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 Not consistent with diabetic retinopathy and blood glucose 108 in 2018 (likely not fasting test). Unlikely to be ocular ischemic syndrome.   - Obtain 2007 records from [place] if possible (clinic that patient recalled did not have any records)   - Additional testing for an underlying inflammatory or systemic vascular etiology should be considered if the right eye develops similar retinal vascular changes, but observe for now   - Consider laser photocoagulation of peripheral aneurysms and areas of nonperfusion, discussed with patient but he is not interested at this time    2. Cystoid macular edema left eye secondary to #1, likely chronic given ellipsoid zone attenuation on OCT and history. Cystoid macular edema still present but improved after switching to Eylea given incomplete response to monthly Avastin.   - Eylea #3 left eye given today    return to clinic uveitis V/T/D/mac OCT/Optos  photos/Eylea left eye in 2 months.    Attending Physician Attestation:  Complete documentation of historical and exam elements from today's encounter can be found in the full encounter summary report (not reduplicated in this progress note).  I personally obtained the chief complaint(s) and history of present illness.  I confirmed and edited as necessary the review of systems, past medical/surgical history, family history, social history, and examination findings as documented by others; and I examined the patient myself.  I personally reviewed the relevant tests, images, and reports as documented above.  I formulated and edited as necessary the assessment and plan and discussed the findings and management plan with the patient and family.  - Wanda Duggan M.D.

## 2021-01-29 NOTE — NURSING NOTE
Chief Complaints and History of Present Illnesses   Patient presents with     Follow Up     CME (cystoid macular edema), left      Chief Complaint(s) and History of Present Illness(es)     Follow Up     Laterality: left eye    Course: stable    Associated symptoms: Negative for dryness, eye pain, redness and tearing    Treatments tried: no treatments    Pain scale: 0/10    Comments: CME (cystoid macular edema), left               Comments     He states that his vision has seemed stable in both eyes, since his last eye exam.  Patient denies having any eye discomfort.    Tarsha De La Paz, COT 1:46 PM  January 29, 2021

## 2021-03-26 ENCOUNTER — OFFICE VISIT (OUTPATIENT)
Dept: OPHTHALMOLOGY | Facility: CLINIC | Age: 46
End: 2021-03-26
Attending: OPHTHALMOLOGY
Payer: COMMERCIAL

## 2021-03-26 DIAGNOSIS — H35.012 RETINAL VASCULAR CHANGES, LEFT: ICD-10-CM

## 2021-03-26 DIAGNOSIS — H35.352 CME (CYSTOID MACULAR EDEMA), LEFT: Primary | ICD-10-CM

## 2021-03-26 PROCEDURE — 250N000011 HC RX IP 250 OP 636: Performed by: OPHTHALMOLOGY

## 2021-03-26 PROCEDURE — 92134 CPTRZ OPH DX IMG PST SGM RTA: CPT | Performed by: OPHTHALMOLOGY

## 2021-03-26 PROCEDURE — 99207 FUNDUS PHOTOS OU (BOTH EYES): CPT | Mod: 26 | Performed by: OPHTHALMOLOGY

## 2021-03-26 PROCEDURE — 99207 PR DROP WITH A PROCEDURE: CPT | Performed by: OPHTHALMOLOGY

## 2021-03-26 PROCEDURE — 67028 INJECTION EYE DRUG: CPT | Mod: LT | Performed by: OPHTHALMOLOGY

## 2021-03-26 PROCEDURE — 92250 FUNDUS PHOTOGRAPHY W/I&R: CPT | Performed by: OPHTHALMOLOGY

## 2021-03-26 PROCEDURE — G0463 HOSPITAL OUTPT CLINIC VISIT: HCPCS

## 2021-03-26 RX ADMIN — AFLIBERCEPT 2 MG: 40 INJECTION, SOLUTION INTRAVITREAL at 12:45

## 2021-03-26 ASSESSMENT — CONF VISUAL FIELD
METHOD: COUNTING FINGERS
OD_NORMAL: 1
OS_NORMAL: 1

## 2021-03-26 ASSESSMENT — VISUAL ACUITY
CORRECTION_TYPE: GLASSES
OS_CC+: -2
OS_PH_CC: 20/70
OD_CC: 20/25
METHOD: SNELLEN - LINEAR
OS_CC: 20/70

## 2021-03-26 ASSESSMENT — CUP TO DISC RATIO
OD_RATIO: 0.2
OS_RATIO: 0.2

## 2021-03-26 ASSESSMENT — EXTERNAL EXAM - LEFT EYE: OS_EXAM: NORMAL

## 2021-03-26 ASSESSMENT — EXTERNAL EXAM - RIGHT EYE: OD_EXAM: NORMAL

## 2021-03-26 ASSESSMENT — TONOMETRY
IOP_METHOD: TONOPEN
OS_IOP_MMHG: 20
OD_IOP_MMHG: 19

## 2021-03-26 ASSESSMENT — REFRACTION_WEARINGRX
OS_AXIS: 047
OD_SPHERE: -11.75
OD_AXIS: 149
OS_SPHERE: -13.25
SPECS_TYPE: SVL
OS_CYLINDER: +2.50
OD_CYLINDER: +2.25

## 2021-03-26 ASSESSMENT — SLIT LAMP EXAM - LIDS
COMMENTS: NORMAL
COMMENTS: NORMAL

## 2021-03-26 NOTE — PROGRESS NOTES
"HPI: Zach Zaragoza is a 45 year old male here for follow up, here for scheduled Eylea left eye. He reports that his vision seems stable. No new eye concerns.    Current ocular medications: None.  Prior ocular medications: Predforte (ineffective for cystoid macular edema).    Ocular history:   1. 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   -  sudden onset of blurry vision \"patchy\" left eye only. He was seen by an eye care provider in [place], told that he had \"burst capillaries\" in the back of his left eye, and treated with corticosteroid eye drops left eye. Mr. Zaragoza reports that his vision left eye seemed to return to normal after 2 weeks of eye drops, but he did not follow up for eye care at that time. About [id number] he noticed that vision left eye was blurry again, but he did not seek eye care until Oct 2018 with Dr. Johnson and did not present to uveitis clinic until 2019.   -  S/p intravitreal Avastin left eye starting 10.16.19, switched to Eylea 10.30.20 given incomplete response  2. High myopia both eyes.  3. No history of ocular trauma, surgery, or laser.    Medical history:  1. No chronic medication conditions.  2. Syncopal episode with emesis in 2018 -> unremarkable physical exam/workup at [facility] [place].  3. Uses over the counter melatonin.  4. S/p wisdom teeth extraction ().    Family history: Positive for retinal vascular disease \"a blood vessel \" (), diabetes mellitus, cancer, arthritis. Negative for autoimmune disease.    Social history: Quit smoking in ; 1-2 alcoholic beverages per day; no IVDU. Works as a . Has never lived outside the [facility], but has traveled to [place], [place], and [place]. Currently has a cat as a pet and previously had a dog.    Laboratory/Imaging Results:  18 complete blood count (CBC) without diff and HIV normal/negative. Glucose mildly elevated at 108.  9.5.18 chest x-ray read as " normal.    Ocular Imaging:  Macular OCT 3.26.21:  Right eye: within normal limits, stable.  Left eye: mild central cystoid macular edema again improved, hyperreflective intraretinal puncta, stable mild attenuation of EZ centrally    Retinal nerve fiber layer OCT 7.19.19:  Right eye: mild vitreopapillary traction, no thinning, G106  Left eye: mild increase in nerve fiber layer thickness compared to right eye, G130    Optos fluorescein angiography 7.19.19:  Right eye: ?normal (poor patient cooperation)  Left eye: transit not visualized (poor patient cooperation) but both A/V filled at 30 sec, late petalloid macular leakage, late staining along large venuoles, peripheral point aneurysm-like leakage    Impression/Plan:  1. 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 Not consistent with diabetic retinopathy and blood glucose 108 in 2018 (likely not fasting test). Unlikely to be ocular ischemic syndrome.   - Obtain 2007 records from [place] if possible (but clinic that patient recalled did not have any records)   - Additional testing for an underlying inflammatory or systemic vascular etiology should be considered if the right eye develops similar retinal vascular changes, but observe for now   - Consider laser photocoagulation of peripheral aneurysms and areas of nonperfusion, discussed with patient but he is not interested at this time    2. Cystoid macular edema left eye secondary to #1, likely chronic given ellipsoid zone attenuation on OCT and history. Cystoid macular edema still present but improved after switching to Eylea given incomplete response to monthly Avastin.   -  S/p intravitreal Avastin left eye 10.16.19, 12.20.19, 4.10.20, 7.3.20, 8.14.20, 9.18.20   - S/p intravitreal Eylea left eye 10.30.20,  12.1.20, 1.29.21   - Eylea #4 left eye given today    return to clinic uveitis V/T/D/mac OCT/Optos photos/Eylea left eye in 2.5 months.    Attending Physician Attestation:  Complete documentation of historical and exam elements from today's encounter can be found in the full encounter summary report (not reduplicated in this progress note).  I personally obtained the chief complaint(s) and history of present illness.  I confirmed and edited as necessary the review of systems, past medical/surgical history, family history, social history, and examination findings as documented by others; and I examined the patient myself.  I personally reviewed the relevant tests, images, and reports as documented above.  I formulated and edited as necessary the assessment and plan and discussed the findings and management plan with the patient and family.  - Wanda Duggan M.D.

## 2021-03-26 NOTE — NURSING NOTE
Chief Complaints and History of Present Illnesses   Patient presents with     Cystoid Macular Edema Follow Up     Chief Complaint(s) and History of Present Illness(es)     Cystoid Macular Edema Follow Up     Laterality: left eye    Course: stable    Associated symptoms: Negative for eye pain, dryness, redness and tearing    Treatments tried: no treatments    Pain scale: 0/10              Comments     He states that his vision has seemed stable in both eyes, since his last eye exam.  Patient denies having any eye discomfort.    DARION Keller 10:47 AM  March 26, 2021

## 2021-06-18 ENCOUNTER — OFFICE VISIT (OUTPATIENT)
Dept: OPHTHALMOLOGY | Facility: CLINIC | Age: 46
End: 2021-06-18
Attending: OPHTHALMOLOGY
Payer: COMMERCIAL

## 2021-06-18 DIAGNOSIS — H35.352 CME (CYSTOID MACULAR EDEMA), LEFT: Primary | ICD-10-CM

## 2021-06-18 DIAGNOSIS — H35.012 RETINAL VASCULAR CHANGES, LEFT: ICD-10-CM

## 2021-06-18 PROCEDURE — 92134 CPTRZ OPH DX IMG PST SGM RTA: CPT | Performed by: OPHTHALMOLOGY

## 2021-06-18 PROCEDURE — 250N000011 HC RX IP 250 OP 636: Performed by: OPHTHALMOLOGY

## 2021-06-18 PROCEDURE — G0463 HOSPITAL OUTPT CLINIC VISIT: HCPCS

## 2021-06-18 PROCEDURE — 67028 INJECTION EYE DRUG: CPT | Mod: LT | Performed by: OPHTHALMOLOGY

## 2021-06-18 PROCEDURE — 99214 OFFICE O/P EST MOD 30 MIN: CPT | Mod: 25 | Performed by: OPHTHALMOLOGY

## 2021-06-18 RX ADMIN — AFLIBERCEPT 2 MG: 40 INJECTION, SOLUTION INTRAVITREAL at 14:44

## 2021-06-18 ASSESSMENT — SLIT LAMP EXAM - LIDS
COMMENTS: NORMAL
COMMENTS: NORMAL

## 2021-06-18 ASSESSMENT — VISUAL ACUITY
OD_CC: 20/25
OS_CC: 20/70
OD_CC+: -1
OS_CC+: -1+1
METHOD: SNELLEN - LINEAR
CORRECTION_TYPE: GLASSES

## 2021-06-18 ASSESSMENT — CUP TO DISC RATIO
OD_RATIO: 0.2
OS_RATIO: 0.2

## 2021-06-18 ASSESSMENT — REFRACTION_WEARINGRX
OD_AXIS: 149
OS_CYLINDER: +2.50
OS_AXIS: 047
SPECS_TYPE: SVL
OD_CYLINDER: +2.25
OD_SPHERE: -11.75
OS_SPHERE: -13.25

## 2021-06-18 ASSESSMENT — TONOMETRY
IOP_METHOD: TONOPEN
OS_IOP_MMHG: 14
OD_IOP_MMHG: 14

## 2021-06-18 ASSESSMENT — EXTERNAL EXAM - LEFT EYE: OS_EXAM: NORMAL

## 2021-06-18 ASSESSMENT — EXTERNAL EXAM - RIGHT EYE: OD_EXAM: NORMAL

## 2021-06-18 ASSESSMENT — CONF VISUAL FIELD
OD_NORMAL: 1
OS_NORMAL: 1

## 2021-06-18 NOTE — PROGRESS NOTES
"HPI: Zach Zaragoza is a 45 year old male here for follow up, here for scheduled Eylea left eye. He reports that his vision seems stable. Again, no new eye concerns.    Current ocular medications: None.  Prior ocular medications: Predforte (ineffective for cystoid macular edema).    Ocular history:   1. 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   -  sudden onset of blurry vision \"patchy\" left eye only. He was seen by an eye care provider in [place], told that he had \"burst capillaries\" in the back of his left eye, and treated with corticosteroid eye drops left eye. Mr. Zaragoza reports that his vision left eye seemed to return to normal after 2 weeks of eye drops, but he did not follow up for eye care at that time. About [id number] he noticed that vision left eye was blurry again, but he did not seek eye care until Oct 2018 with Dr. Johnson and did not present to uveitis clinic until 2019.   -  S/p intravitreal Avastin left eye starting 10.16.19, switched to Eylea 10.30.20 given incomplete response  2. High myopia both eyes.  3. No history of ocular trauma, surgery, or laser.    Medical history:  1. No chronic medication conditions.  2. Syncopal episode with emesis in 2018 -> unremarkable physical exam/workup at [facility] [place] [place].  3. Uses over the counter melatonin.  4. S/p wisdom teeth extraction ().    Family history: Positive for retinal vascular disease \"a blood vessel \" (), diabetes mellitus, cancer, arthritis. Negative for autoimmune disease.    Social history: Quit smoking in ; 1-2 alcoholic beverages per day; no IVDU. Works as a . Has never lived outside the [facility], but has traveled to [place], [place], and [place]. Currently has a cat as a pet and previously had a dog.    Laboratory/Imaging Results:  18 complete blood count (CBC) without diff and HIV normal/negative. Glucose mildly elevated at 108.  9.5.18 chest x-ray read as " normal.    Ocular Imaging:  Macular OCT 6.18.21:  Right eye: within normal limits, stable.  Left eye: improved (and essentially normal) foveal contour, mild cystoid macular edema again improved, hyperreflective intraretinal puncta slightly decreased in size/number, stable mild attenuation of EZ centrally    Retinal nerve fiber layer OCT 7.19.19:  Right eye: mild vitreopapillary traction, no thinning, G106  Left eye: mild increase in nerve fiber layer thickness compared to right eye, G130    Optos fluorescein angiography 7.19.19:  Right eye: ?normal (poor patient cooperation)  Left eye: transit not visualized (poor patient cooperation) but both A/V filled at 30 sec, late petalloid macular leakage, late staining along large venuoles, peripheral point aneurysm-like leakage    Impression/Plan:  1. 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 Not consistent with diabetic retinopathy and blood glucose 108 in 2018 (likely not fasting test). Unlikely to be ocular ischemic syndrome.   - Obtain 2007 records from [place] if possible (but clinic that patient recalled did not have any records)   - Additional testing for an underlying inflammatory or systemic vascular etiology should be considered if the right eye develops similar retinal vascular changes, but observe for now   - Consider laser photocoagulation of peripheral aneurysms and areas of nonperfusion, again discussed with patient but he is not interested at this time    2. Cystoid macular edema left eye secondary to #1, likely chronic given ellipsoid zone attenuation on OCT and history. Cystoid macular edema still present but improved after switching to Eylea given incomplete response to monthly Avastin. Doing well 2.5 months s/p most recent Eylea left eye.   -   S/p intravitreal Avastin left eye 10.16.19, 12.20.19, 4.10.20, 7.3.20, 8.14.20, 9.18.20   - S/p intravitreal Eylea left eye 10.30.20, 12.1.20, 1.29.21, 3.26.21, 6.18.21   - Eylea #5 left eye given today, will extend interval to 3 months    Return to uveitis clinic V/T/D/mac OCT/Eylea left eye in 3 months.    Attending Physician Attestation:  Complete documentation of historical and exam elements from today's encounter can be found in the full encounter summary report (not reduplicated in this progress note).  I personally obtained the chief complaint(s) and history of present illness.  I confirmed and edited as necessary the review of systems, past medical/surgical history, family history, social history, and examination findings as documented by others; and I examined the patient myself.  I personally reviewed the relevant tests, images, and reports as documented above.  I formulated and edited as necessary the assessment and plan and discussed the findings and management plan with the patient and family.  - Wanda Duggan M.D.

## 2021-06-18 NOTE — NURSING NOTE
Chief Complaints and History of Present Illnesses   Patient presents with     Follow Up     CME (cystoid macular edema), left eye     Chief Complaint(s) and History of Present Illness(es)     Follow Up     Laterality: left eye    Course: stable    Associated symptoms: Negative for eye pain, dryness, flashes and floaters    Treatments tried: no treatments    Pain scale: 0/10    Comments: CME (cystoid macular edema), left eye              Comments     Pt states no change in VA since last visit BE    Sindy Martinez COT 12:47 PM June 18, 2021

## 2021-06-20 ENCOUNTER — HEALTH MAINTENANCE LETTER (OUTPATIENT)
Age: 46
End: 2021-06-20

## 2021-09-24 ENCOUNTER — OFFICE VISIT (OUTPATIENT)
Dept: OPHTHALMOLOGY | Facility: CLINIC | Age: 46
End: 2021-09-24
Attending: OPHTHALMOLOGY
Payer: COMMERCIAL

## 2021-09-24 DIAGNOSIS — H35.352 CME (CYSTOID MACULAR EDEMA), LEFT: Primary | ICD-10-CM

## 2021-09-24 DIAGNOSIS — H35.012 RETINAL VASCULAR CHANGES, LEFT: ICD-10-CM

## 2021-09-24 PROCEDURE — 67028 INJECTION EYE DRUG: CPT | Mod: LT | Performed by: OPHTHALMOLOGY

## 2021-09-24 PROCEDURE — 99214 OFFICE O/P EST MOD 30 MIN: CPT | Mod: 25 | Performed by: OPHTHALMOLOGY

## 2021-09-24 PROCEDURE — 92134 CPTRZ OPH DX IMG PST SGM RTA: CPT | Performed by: OPHTHALMOLOGY

## 2021-09-24 PROCEDURE — G0463 HOSPITAL OUTPT CLINIC VISIT: HCPCS

## 2021-09-24 PROCEDURE — 250N000011 HC RX IP 250 OP 636: Performed by: OPHTHALMOLOGY

## 2021-09-24 RX ORDER — CETIRIZINE HYDROCHLORIDE 10 MG/1
10 TABLET ORAL DAILY
COMMUNITY
Start: 2021-08-16

## 2021-09-24 RX ADMIN — AFLIBERCEPT 2 MG: 40 INJECTION, SOLUTION INTRAVITREAL at 13:24

## 2021-09-24 ASSESSMENT — CUP TO DISC RATIO
OS_RATIO: 0.2
OD_RATIO: 0.2

## 2021-09-24 ASSESSMENT — VISUAL ACUITY
OS_CC+: +1
OD_CC: 20/30
OD_PH_CC+: +2
OD_CC+: -1
METHOD: SNELLEN - LINEAR
OD_PH_CC: 20/25
OS_CC: 20/80

## 2021-09-24 ASSESSMENT — REFRACTION_WEARINGRX
OS_CYLINDER: +2.50
OD_AXIS: 149
OS_AXIS: 047
OD_CYLINDER: +2.25
OD_SPHERE: -11.75
OS_SPHERE: -13.25
SPECS_TYPE: SVL

## 2021-09-24 ASSESSMENT — TONOMETRY
OD_IOP_MMHG: 17
IOP_METHOD: TONOPEN
OS_IOP_MMHG: 20

## 2021-09-24 ASSESSMENT — CONF VISUAL FIELD
METHOD: COUNTING FINGERS
OD_NORMAL: 1
OS_NORMAL: 1

## 2021-09-24 ASSESSMENT — EXTERNAL EXAM - LEFT EYE: OS_EXAM: NORMAL

## 2021-09-24 ASSESSMENT — SLIT LAMP EXAM - LIDS
COMMENTS: NORMAL
COMMENTS: NORMAL

## 2021-09-24 ASSESSMENT — EXTERNAL EXAM - RIGHT EYE: OD_EXAM: NORMAL

## 2021-09-24 NOTE — NURSING NOTE
Chief Complaints and History of Present Illnesses   Patient presents with     Cystoid Macular Edema Follow Up     Chief Complaint(s) and History of Present Illness(es)     Cystoid Macular Edema Follow Up     Laterality: left eye    Onset: sudden    Severity: moderate    Frequency: constantly    Timing: throughout the day    Associated symptoms: floaters.  Negative for flashes, eye pain, dryness and itching    Response to treatment: no improvement    Pain scale: 0/10              Comments     Zach is here to continue care for CME (cystoid macular edema), left . He states more floaters LE. Vision seem about the same as last visit    Javier Alexander COT 12:35 PM September 24, 2021

## 2021-09-24 NOTE — PROGRESS NOTES
"HPI: Zach Zaragoza is a 45 year old male here for follow up, here for scheduled Eylea left eye. He reports that his vision seems stable except for a few new floaters left eye. No change right eye. No flashes.    Current ocular medications: None.  Prior ocular medications: Predforte (ineffective for cystoid macular edema).    Ocular history:   1. 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   -  sudden onset of blurry vision \"patchy\" left eye only. He was seen by an eye care provider in [place], told that he had \"burst capillaries\" in the back of his left eye, and treated with corticosteroid eye drops left eye. Mr. Zaragoza reports that his vision left eye seemed to return to normal after 2 weeks of eye drops, but he did not follow up for eye care at that time. About 5151-5512 he noticed that vision left eye was blurry again, but he did not seek eye care until Oct 2018 with Dr. Johnson and did not present to uveitis clinic until 2019.   -  S/p intravitreal Avastin left eye starting 10.16.19, switched to Eylea 10.30.20 given incomplete response  2. High myopia both eyes.  3. No history of ocular trauma, surgery, or laser.    Medical history:  1. No chronic medication conditions.  2. Syncopal episode with emesis in 2018 -> unremarkable physical exam/workup at [facility].  3. Uses over the counter melatonin.  4. S/p wisdom teeth extraction ().    Family history: Positive for retinal vascular disease \"a blood vessel \" (), diabetes mellitus, cancer, arthritis. Negative for autoimmune disease.    Social history: Quit smoking in ; 1-2 alcoholic beverages per day; no IVDU. Works as a . [place] , [place]. Currently has a cat as a pet and previously had a dog.    Laboratory/Imaging Results:  18 complete blood count (CBC) without diff and HIV normal/negative. Glucose mildly " elevated at 108.  9.5.18 chest x-ray read as normal.    Ocular Imaging:  Macular OCT 9.24.21:  Right eye: within normal limits, stable.  Left eye: maintains foveal contour but worsened paracentral cystoid macular edema, hyperreflective intraretinal puncta slightly decreased in size/number, stable mild attenuation of EZ centrally    Retinal nerve fiber layer OCT 7.19.19:  Right eye: mild vitreopapillary traction, no thinning, G106  Left eye: mild increase in nerve fiber layer thickness compared to right eye, G130    Optos fluorescein angiography 7.19.19:  Right eye: ?normal (poor patient cooperation)  Left eye: transit not visualized (poor patient cooperation) but both A/V filled at 30 sec, late petalloid macular leakage, late staining along large venuoles, peripheral point aneurysm-like leakage    Impression/Plan:  1. 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 Not consistent with diabetic retinopathy and blood glucose 108 in 2018 (likely not fasting test). Unlikely to be ocular ischemic syndrome.   - Obtain 2007 records from [place] if possible (but clinic that patient recalled did not have any records)   - Additional testing for an underlying inflammatory or systemic vascular etiology should be considered if the right eye develops similar retinal vascular changes, but observe for now   - Consider laser photocoagulation of peripheral aneurysms and areas of nonperfusion, again discussed with patient but he is not interested at this time    2. Cystoid macular edema left eye secondary to #1, likely chronic given ellipsoid zone attenuation on OCT and history. Cystoid macular edema still present but improved after switching to Eylea given incomplete response to monthly Avastin. Previously was doing well 2.5 months  s/p Eylea left eye but now with increased cystoid macular edema 3 months s/p Eylea.   -  S/p intravitreal Avastin left eye 10.16.19, 12.20.19, 4.10.20, 7.3.20, 8.14.20, 9.18.20   - S/p intravitreal Eylea left eye 10.30.20, 12.1.20, 1.29.21, 3.26.21, 6.18.21, 9.24.21   - Eylea #6 left eye given today, will decrease interval to 2.5 months    Return to uveitis clinic V/T/D/mac OCT/Eylea left eye in 2.5 months.    Attending Physician Attestation:  Complete documentation of historical and exam elements from today's encounter can be found in the full encounter summary report (not reduplicated in this progress note).  I personally obtained the chief complaint(s) and history of present illness.  I confirmed and edited as necessary the review of systems, past medical/surgical history, family history, social history, and examination findings as documented by others; and I examined the patient myself.  I personally reviewed the relevant tests, images, and reports as documented above.  I formulated and edited as necessary the assessment and plan and discussed the findings and management plan with the patient and family.  - Wanda Duggan M.D.

## 2021-10-10 ENCOUNTER — HEALTH MAINTENANCE LETTER (OUTPATIENT)
Age: 46
End: 2021-10-10

## 2021-11-15 ENCOUNTER — TELEPHONE (OUTPATIENT)
Dept: OPHTHALMOLOGY | Facility: CLINIC | Age: 46
End: 2021-11-15
Payer: COMMERCIAL

## 2021-11-15 NOTE — TELEPHONE ENCOUNTER
I scheduled/rescheduled this patient for 01/21/22 at 12:15pm. If this time does not work can you send me a message and I'll reschedule it.    Belgica Garces

## 2022-01-21 ENCOUNTER — OFFICE VISIT (OUTPATIENT)
Dept: OPHTHALMOLOGY | Facility: CLINIC | Age: 47
End: 2022-01-21
Attending: OPHTHALMOLOGY
Payer: COMMERCIAL

## 2022-01-21 DIAGNOSIS — H35.012 RETINAL VASCULAR CHANGES, LEFT: ICD-10-CM

## 2022-01-21 DIAGNOSIS — H35.352 CME (CYSTOID MACULAR EDEMA), LEFT: Primary | ICD-10-CM

## 2022-01-21 PROCEDURE — 67028 INJECTION EYE DRUG: CPT | Mod: LT | Performed by: OPHTHALMOLOGY

## 2022-01-21 PROCEDURE — 250N000011 HC RX IP 250 OP 636: Performed by: OPHTHALMOLOGY

## 2022-01-21 PROCEDURE — 92134 CPTRZ OPH DX IMG PST SGM RTA: CPT | Performed by: OPHTHALMOLOGY

## 2022-01-21 PROCEDURE — G0463 HOSPITAL OUTPT CLINIC VISIT: HCPCS | Mod: 25

## 2022-01-21 RX ADMIN — AFLIBERCEPT 2 MG: 40 INJECTION, SOLUTION INTRAVITREAL at 13:28

## 2022-01-21 ASSESSMENT — VISUAL ACUITY
OD_CC+: +3
METHOD: SNELLEN - LINEAR
OD_CC: 20/30
OD_PH_CC: 20/25
CORRECTION_TYPE: GLASSES
OS_PH_CC+: +1
OS_CC: 20/70
OS_PH_CC: 20/70
OS_CC+: -2

## 2022-01-21 ASSESSMENT — REFRACTION_WEARINGRX
OS_AXIS: 047
OD_SPHERE: -11.75
SPECS_TYPE: SVL
OD_CYLINDER: +2.25
OS_SPHERE: -13.25
OD_AXIS: 149
OS_CYLINDER: +2.50

## 2022-01-21 ASSESSMENT — CONF VISUAL FIELD
METHOD: COUNTING FINGERS
OS_NORMAL: 1
OD_NORMAL: 1

## 2022-01-21 ASSESSMENT — TONOMETRY
OS_IOP_MMHG: 21
IOP_METHOD: APPLANATION
OD_IOP_MMHG: 16

## 2022-01-21 NOTE — NURSING NOTE
Chief Complaints and History of Present Illnesses   Patient presents with     Cystoid Macular Edema Follow Up     Chief Complaint(s) and History of Present Illness(es)     Cystoid Macular Edema Follow Up     Laterality: left eye    Onset: gradual    Onset: years ago    Course: stable    Associated symptoms: floaters.  Negative for dryness, eye pain, tearing, flashes, photophobia, glare and haloes    Treatments tried: no treatments    Pain scale: 0/10              Comments     Pt here for CME (cystoid macular edema), left.  Pt states vision is stable since last visit.  Floaters are the same.  No ocular medications.    DARION Almeida January 21, 2022 12:15 PM

## 2022-01-28 ASSESSMENT — SLIT LAMP EXAM - LIDS
COMMENTS: NORMAL
COMMENTS: NORMAL

## 2022-01-28 ASSESSMENT — EXTERNAL EXAM - LEFT EYE: OS_EXAM: NORMAL

## 2022-01-28 ASSESSMENT — CUP TO DISC RATIO
OS_RATIO: 0.2
OD_RATIO: 0.2

## 2022-01-28 ASSESSMENT — EXTERNAL EXAM - RIGHT EYE: OD_EXAM: NORMAL

## 2022-01-28 NOTE — PROGRESS NOTES
"HPI: Zach Zaragoza is a 46 year old male here for 4 month (supposed to be 2.5 month) follow up for scheduled Eylea left eye for cystoid macular edema. He reports that his vision seems stable since last visit. Floaters left eye are stable. No change right eye. No flashes.    Current ocular medications: None.  Prior ocular medications: Predforte (ineffective for cystoid macular edema).    Ocular history:   1. 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   -  sudden onset of blurry vision \"patchy\" left eye only. He was seen by an eye care provider in [place], told that he had \"burst capillaries\" in the back of his left eye, and treated with corticosteroid eye drops left eye. Mr. Zaragoza reports that his vision left eye seemed to return to normal after 2 weeks of eye drops, but he did not follow up for eye care at that time. About [id number] he noticed that vision left eye was blurry again, but he did not seek eye care until Oct 2018 with Dr. Johnson and did not present to uveitis clinic until 2019.   -  S/p intravitreal Avastin left eye starting 10.16.19, switched to Eylea 10.30.20 given incomplete response  2. High myopia both eyes.  3. No history of ocular trauma, surgery, or laser.    Medical history:  1. No chronic medication conditions.  2. Syncopal episode with emesis in 2018 -> unremarkable physical exam/workup at [facility].  3. Uses over the counter melatonin.  4. S/p wisdom teeth extraction ().    Family history: Positive for retinal vascular disease \"a blood vessel \" (), diabetes mellitus, cancer, arthritis. Negative for autoimmune disease.    Social history: Quit smoking in ; 1-2 alcoholic beverages per day; no IVDU. Works as a . [place] , [place] [facility]. Currently has a cat as a pet and previously had a dog.    Laboratory/Imaging Results:  18 complete blood count " (CBC) without diff and HIV normal/negative. Glucose mildly elevated at 108.  9.5.18 chest x-ray read as normal.    Ocular Imaging:  Macular OCT 1.21.22:  Right eye: within normal limits, stable.  Left eye: maintains foveal contour but worsened central and paracentral cystoid macular edema, hyperreflective intraretinal puncta slightly decreased in size/number, stable mild attenuation of EZ centrally    Retinal nerve fiber layer OCT 7.19.19:  Right eye: mild vitreopapillary traction, no thinning, G106  Left eye: mild increase in nerve fiber layer thickness compared to right eye, G130    Optos fluorescein angiography 7.19.19:  Right eye: ?normal (poor patient cooperation)  Left eye: transit not visualized (poor patient cooperation) but both A/V filled at 30 sec, late petalloid macular leakage, late staining along large venuoles, peripheral point aneurysm-like leakage    Impression/Plan:  1. 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 Not consistent with diabetic retinopathy and blood glucose 108 in 2018 (likely not fasting test). Unlikely to be ocular ischemic syndrome.   - Obtain 2007 records from [place] if possible (but clinic that patient recalled did not have any records)   - Additional testing for an underlying inflammatory or systemic vascular etiology should be considered if the right eye develops similar retinal vascular changes, but observe for now   - Consider laser photocoagulation of peripheral aneurysms and areas of nonperfusion, again discussed with patient but he is not interested at this time    2. Cystoid macular edema left eye secondary to #1, likely chronic given ellipsoid zone attenuation on OCT and history. Cystoid macular edema still present but improved after switching to Eylea given  incomplete response to monthly Avastin. Previously was doing well 2.5 months s/p Eylea left eye but now with increased cystoid macular edema 4 months s/p Eylea.   -  S/p intravitreal Avastin left eye 10.16.19, 12.20.19, 4.10.20, 7.3.20, 8.14.20, 9.18.20   - S/p intravitreal Eylea left eye 10.30.20, 12.1.20, 1.29.21, 3.26.21, 6.18.21, 9.24.21, 1.28.22   - Eylea #7 left eye given today, will decrease interval to 2.5 months    Return to uveitis clinic V/T/D/mac OCT/Eylea left eye in 2 months.    Attending Physician Attestation:  Complete documentation of historical and exam elements from today's encounter can be found in the full encounter summary report (not reduplicated in this progress note).  I personally obtained the chief complaint(s) and history of present illness.  I confirmed and edited as necessary the review of systems, past medical/surgical history, family history, social history, and examination findings as documented by others; and I examined the patient myself.  I personally reviewed the relevant tests, images, and reports as documented above.  I formulated and edited as necessary the assessment and plan and discussed the findings and management plan with the patient and family.  - Wanda Duggan M.D.

## 2022-04-15 ENCOUNTER — OFFICE VISIT (OUTPATIENT)
Dept: OPHTHALMOLOGY | Facility: CLINIC | Age: 47
End: 2022-04-15
Attending: OPHTHALMOLOGY
Payer: COMMERCIAL

## 2022-04-15 VITALS — OXYGEN SATURATION: 96 % | HEART RATE: 68 BPM | SYSTOLIC BLOOD PRESSURE: 124 MMHG | DIASTOLIC BLOOD PRESSURE: 81 MMHG

## 2022-04-15 DIAGNOSIS — H35.012 RETINAL VASCULAR CHANGES, LEFT: ICD-10-CM

## 2022-04-15 DIAGNOSIS — H35.352 CME (CYSTOID MACULAR EDEMA), LEFT: Primary | ICD-10-CM

## 2022-04-15 PROCEDURE — G0463 HOSPITAL OUTPT CLINIC VISIT: HCPCS | Mod: 25

## 2022-04-15 PROCEDURE — 99214 OFFICE O/P EST MOD 30 MIN: CPT | Mod: 25 | Performed by: OPHTHALMOLOGY

## 2022-04-15 PROCEDURE — 67028 INJECTION EYE DRUG: CPT | Mod: LT | Performed by: OPHTHALMOLOGY

## 2022-04-15 PROCEDURE — 250N000011 HC RX IP 250 OP 636: Performed by: OPHTHALMOLOGY

## 2022-04-15 PROCEDURE — 92134 CPTRZ OPH DX IMG PST SGM RTA: CPT | Performed by: OPHTHALMOLOGY

## 2022-04-15 RX ADMIN — AFLIBERCEPT 2 MG: 40 INJECTION, SOLUTION INTRAVITREAL at 11:12

## 2022-04-15 ASSESSMENT — TONOMETRY
IOP_METHOD: TONOPEN
OS_IOP_MMHG: 13
OD_IOP_MMHG: 14

## 2022-04-15 ASSESSMENT — REFRACTION_WEARINGRX
OD_SPHERE: -11.75
OD_AXIS: 149
OD_CYLINDER: +2.25
OS_CYLINDER: +2.50
OS_SPHERE: -13.25
SPECS_TYPE: SVL
OS_AXIS: 047

## 2022-04-15 ASSESSMENT — CONF VISUAL FIELD
OD_NORMAL: 1
OS_NORMAL: 1
METHOD: COUNTING FINGERS

## 2022-04-15 ASSESSMENT — CUP TO DISC RATIO
OD_RATIO: 0.2
OS_RATIO: 0.2

## 2022-04-15 ASSESSMENT — EXTERNAL EXAM - LEFT EYE: OS_EXAM: NORMAL

## 2022-04-15 ASSESSMENT — VISUAL ACUITY
METHOD: SNELLEN - LINEAR
OD_CC: 20/25
CORRECTION_TYPE: GLASSES
OS_CC: 20/70

## 2022-04-15 ASSESSMENT — SLIT LAMP EXAM - LIDS
COMMENTS: NORMAL
COMMENTS: NORMAL

## 2022-04-15 ASSESSMENT — EXTERNAL EXAM - RIGHT EYE: OD_EXAM: NORMAL

## 2022-04-15 NOTE — NURSING NOTE
Chief Complaints and History of Present Illnesses   Patient presents with     Follow Up     2 month follow up.   1. Retinal vascular changes (peripheral exudates and telangiectasias) left eye  2. Cystoid macular edema left eye    Patient notes vision is stable each eye in the last couple months.     DARION Najera 10:28 AM 04/15/2022       Chief Complaint(s) and History of Present Illness(es)     Follow Up     Laterality: left eye    Onset: months ago    Course: stable    Associated symptoms: floaters (not new).  Negative for dryness, eye pain and flashes    Pain scale: 0/10    Comments: 2 month follow up.   1. Retinal vascular changes (peripheral exudates and telangiectasias) left eye  2. Cystoid macular edema left eye    Patient notes vision is stable each eye in the last couple months.     DARION Najera 10:28 AM 04/15/2022

## 2022-04-15 NOTE — PROGRESS NOTES
"HPI: Zach Zaragoza is a 46 year old male here for 12 week follow up for scheduled Eylea left eye for cystoid macular edema. He reports that his vision seems stable since last visit. Floaters left eye are stable. No flashes.     Current ocular medications: None.  Prior ocular medications: Predforte (ineffective for cystoid macular edema).    Ocular history:   1. 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   -  sudden onset of blurry vision \"patchy\" left eye only. He was seen by an eye care provider in [place], told that he had \"burst capillaries\" in the back of his left eye, and treated with corticosteroid eye drops left eye. Mr. Zaragoza reports that his vision left eye seemed to return to normal after 2 weeks of eye drops, but he did not follow up for eye care at that time. About [id number] he noticed that vision left eye was blurry again, but he did not seek eye care until Oct 2018 with Dr. Johnson and did not present to uveitis clinic until 2019.   -  S/p intravitreal Avastin left eye starting 10.16.19, switched to Eylea 10.30.20 given incomplete response  2. High myopia both eyes.  3. Vasovagal syncope following ocular injections.  4. No history of ocular trauma, surgery, or laser.    Medical history:  1. No chronic medication conditions.  2. Syncopal episode with emesis in 2018 -> unremarkable physical exam/workup at [facility].  3. Uses over the counter melatonin.  4. S/p wisdom teeth extraction ().    Family history: Positive for retinal vascular disease \"a blood vessel \" (GM), diabetes mellitus, cancer, arthritis. Negative for autoimmune disease.    Social history: Quit smoking in ; 1-2 alcoholic beverages per day; no IVDU. Works as a . [place] , but has traveled to [place], [place], and [place]. Currently has a cat as a pet and previously had a dog.    Laboratory/Imaging Results:  18 complete blood " count (CBC) without diff and HIV normal/negative. Glucose mildly elevated at 108.  9.5.18 chest x-ray read as normal.    Ocular Imaging:  Macular OCT 4.15.22:  Right eye: within normal limits, stable.  Left eye: improved foveal contour with mild paracentral cystoid macular edema, hyperreflective intraretinal puncta slightly decreased in size/number, stable mild attenuation of EZ centrally    Retinal nerve fiber layer OCT 7.19.19:  Right eye: mild vitreopapillary traction, no thinning, G106  Left eye: mild increase in nerve fiber layer thickness compared to right eye, G130    Optos fluorescein angiography 7.19.19:  Right eye: ?normal (poor patient cooperation)  Left eye: transit not visualized (poor patient cooperation) but both A/V filled at 30 sec, late petalloid macular leakage, late staining along large venuoles, peripheral point aneurysm-like leakage    Impression/Plan:  1. 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 Not consistent with diabetic retinopathy and blood glucose 108 in 2018 (likely not fasting test). Unlikely to be ocular ischemic syndrome.   - Obtain 2007 records from [place] if possible (but clinic that patient recalled did not have any records)   - Additional testing for an underlying inflammatory or systemic vascular etiology should be considered if the right eye develops similar retinal vascular changes, but observe for now   - Consider laser photocoagulation of peripheral aneurysms and areas of nonperfusion, again discussed with patient but he is not interested at this time    2. Cystoid macular edema left eye secondary to #1, likely chronic given ellipsoid zone attenuation on OCT and history. Cystoid macular edema still present but improved after switching to Eylea given incomplete  response to monthly Avastin. Previously had increased cystoid macular edema 4 months s/p Eylea left eye but today doing well 12 weeks s/p Eylea left eye.   -  S/p intravitreal Avastin left eye 10.16.19, 12.20.19, 4.10.20, 7.3.20, 8.14.20, 9.18.20   - S/p intravitreal Eylea left eye 10.30.20, 12.1.20, 1.29.21, 3.26.21, 6.18.21, 9.24.21, 1.28.22, 4.15.22   - Eylea #8 left eye given today, will increase interval to 14 weeks    3. Recurrent vasovagal syncope after injection; occurred today. Wife not able to be present today.   - Recommend that wife be present for any additional injections    Return to uveitis clinic V/T/D/mac OCT/Eylea left eye in  14 weeks    Attending Physician Attestation:  Complete documentation of historical and exam elements from today's encounter can be found in the full encounter summary report (not reduplicated in this progress note).  I personally obtained the chief complaint(s) and history of present illness.  I confirmed and edited as necessary the review of systems, past medical/surgical history, family history, social history, and examination findings as documented by others; and I examined the patient myself.  I personally reviewed the relevant tests, images, and reports as documented above.  I formulated and edited as necessary the assessment and plan and discussed the findings and management plan with the patient and family.  - Wanda Duggan M.D.

## 2022-04-15 NOTE — NURSING NOTE
Pt became light headed and fainted following eye injection  No head injury    Pt leaned back and leg up and soon recovered    No chest pain, no shortness of breath    Pt alert and orientated times 3.    Vitals about 5 minutes after recovery--  124/81 hr 68     Pt feeling much improved 10 minutes after occurrence    Previous vaso vagal respondes in past    Dr. Duggan present    Offered pt ED evaluation vs monitoring for full recovery    Pt prefers to hold on ED visit and states feeling much better.    Will continue to monitor over next 30 minutes.    Lambert Ahuja RN 11:42 AM 04/15/22

## 2022-07-16 ENCOUNTER — HEALTH MAINTENANCE LETTER (OUTPATIENT)
Age: 47
End: 2022-07-16

## 2022-07-22 ENCOUNTER — OFFICE VISIT (OUTPATIENT)
Dept: OPHTHALMOLOGY | Facility: CLINIC | Age: 47
End: 2022-07-22
Attending: OPHTHALMOLOGY
Payer: COMMERCIAL

## 2022-07-22 DIAGNOSIS — H35.012 RETINAL VASCULAR CHANGES, LEFT: ICD-10-CM

## 2022-07-22 DIAGNOSIS — H35.352 CME (CYSTOID MACULAR EDEMA), LEFT: Primary | ICD-10-CM

## 2022-07-22 PROCEDURE — G0463 HOSPITAL OUTPT CLINIC VISIT: HCPCS | Performed by: TECHNICIAN/TECHNOLOGIST

## 2022-07-22 PROCEDURE — 250N000011 HC RX IP 250 OP 636: Performed by: OPHTHALMOLOGY

## 2022-07-22 PROCEDURE — 67028 INJECTION EYE DRUG: CPT | Mod: LT | Performed by: OPHTHALMOLOGY

## 2022-07-22 PROCEDURE — 92134 CPTRZ OPH DX IMG PST SGM RTA: CPT | Performed by: OPHTHALMOLOGY

## 2022-07-22 RX ADMIN — AFLIBERCEPT 2 MG: 40 INJECTION, SOLUTION INTRAVITREAL at 11:00

## 2022-07-22 ASSESSMENT — VISUAL ACUITY
METHOD: SNELLEN - LINEAR
OS_CC: 20/70
OS_CC+: -1
OD_CC+: -2
OD_CC: 20/25

## 2022-07-22 ASSESSMENT — TONOMETRY
IOP_METHOD: ICARE
OS_IOP_MMHG: 11
OD_IOP_MMHG: 12

## 2022-07-22 ASSESSMENT — CONF VISUAL FIELD
METHOD: COUNTING FINGERS
OS_NORMAL: 1
OD_NORMAL: 1

## 2022-07-22 ASSESSMENT — REFRACTION_WEARINGRX
OS_SPHERE: -13.25
OS_AXIS: 047
SPECS_TYPE: SVL
OD_AXIS: 149
OS_CYLINDER: +2.50
OD_SPHERE: -11.75
OD_CYLINDER: +2.25

## 2022-07-22 ASSESSMENT — CUP TO DISC RATIO
OD_RATIO: 0.2
OS_RATIO: 0.2

## 2022-07-22 ASSESSMENT — SLIT LAMP EXAM - LIDS
COMMENTS: NORMAL
COMMENTS: NORMAL

## 2022-07-22 ASSESSMENT — EXTERNAL EXAM - RIGHT EYE: OD_EXAM: NORMAL

## 2022-07-22 ASSESSMENT — EXTERNAL EXAM - LEFT EYE: OS_EXAM: NORMAL

## 2022-07-22 NOTE — PROGRESS NOTES
"HPI: Zach Zaragoza is a 46 year old male here for 14 week follow up for scheduled Eylea left eye for cystoid macular edema. He reports that his vision seems stable since last visit. Floaters left eye are stable. No flashes.     Current ocular medications: None.  Prior ocular medications: Predforte (ineffective for cystoid macular edema).    Ocular history:   1. 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   -  sudden onset of blurry vision \"patchy\" left eye only. He was seen by an eye care provider in [place], told that he had \"burst capillaries\" in the back of his left eye, and treated with corticosteroid eye drops left eye. Mr. Zaragoza reports that his vision left eye seemed to return to normal after 2 weeks of eye drops, but he did not follow up for eye care at that time. About [id number] he noticed that vision left eye was blurry again, but he did not seek eye care until Oct 2018 with Dr. Johnson and did not present to uveitis clinic until 2019.   -  S/p intravitreal Avastin left eye starting 10.16.19 (incomplete response), switched to Eylea 10.30.20  2. High myopia both eyes.  3. Vasovagal syncope following ocular injections.  4. No history of ocular trauma, surgery, or laser.    Medical history:  1. No chronic medication conditions.  2. Syncopal episode with emesis in 2018 -> [place] [facility]  3. Uses over the counter melatonin.  4. S/p wisdom teeth extraction ().    Family history: Positive for retinal vascular disease \"a blood vessel \" (GM), diabetes mellitus, cancer, arthritis. Negative for autoimmune disease.    Social history: Quit smoking in ; 1-2 alcoholic beverages per day; no IVDU. Works as a . Has never lived outside the [facility], but has [place] to [place] [facility] [facility]. Currently has a cat as a pet and previously had a dog.    Laboratory/Imaging Results:  18 complete blood count " (CBC) without diff and HIV normal/negative. Glucose mildly elevated at 108.  9.5.18 chest x-ray read as normal.    Ocular Imaging:  Macular OCT 7.22.22:  Right eye: within normal limits, stable.  Left eye: maintains foveal contour but there is mild central cystoid macular edema and worsened mild/moderate paracentral cystoid macular edema, hyperreflective intraretinal puncta slightly decreased in size/number, stable mild attenuation of EZ centrally    Retinal nerve fiber layer OCT 7.19.19:  Right eye: mild vitreopapillary traction, no thinning, G106  Left eye: mild increase in nerve fiber layer thickness compared to right eye, G130    Optos fluorescein angiography 7.19.19:  Right eye: ?normal (poor patient cooperation)  Left eye: transit not visualized (poor patient cooperation) but both A/V filled at 30 sec, late petalloid macular leakage, late staining along large venuoles, peripheral point aneurysm-like leakage    Impression/Plan:  1. 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 Not consistent with diabetic retinopathy and blood glucose 108 in 2018 (likely not fasting test). Unlikely to be ocular ischemic syndrome.   - Obtain 2007 records from [place] if possible (but clinic that patient recalled did not have any records)   - Additional testing for an underlying inflammatory or systemic vascular etiology should be considered if the right eye develops similar retinal vascular changes, but observe for now   - Consider laser photocoagulation of peripheral aneurysms and areas of nonperfusion, again discussed with patient but he is not interested at this time    2. Cystoid macular edema left eye secondary to #1, likely chronic given ellipsoid zone attenuation on OCT and history. Cystoid macular edema still present  but improved after switching to Eylea given incomplete response to monthly Avastin. Previously had increased cystoid macular edema 4 months s/p Eylea left eye but today doing well 12 weeks s/p Eylea left eye.   -  S/p intravitreal Avastin left eye 10.16.19, 12.20.19, 4.10.20, 7.3.20, 8.14.20, 9.18.20   - S/p intravitreal Eylea left eye 10.30.20, 12.1.20, 1.29.21, 3.26.21, 6.18.21, 9.24.21, 1.28.22, 4.15.22   - Eylea #9 left eye given today, will decrease and hold interval at 12 weeks    3. Recurrent vasovagal syncope after injection; did not occur today. Wife not able to be present today. Zach drank a bottle of water prior to injection   - Recommend that Zach drink water just prior to additional injections and if possible have wife be present    Return to uveitis clinic V/T/MR/D/mac OCT/Eylea left eye in 12 weeks    Attending Physician Attestation:  Complete documentation of historical and exam elements from today's encounter can be found in the full encounter summary report (not reduplicated in this progress note).  I personally obtained the chief complaint(s) and history of present illness.  I confirmed and edited as necessary the review of systems, past medical/surgical history, family history, social history, and examination findings as documented by others; and I examined the patient myself.  I personally reviewed the relevant tests, images, and reports as documented above.  I formulated and edited as necessary the assessment and plan and discussed the findings and management plan with the patient and family.  - Wanda Duggan M.D.

## 2022-09-18 ENCOUNTER — HEALTH MAINTENANCE LETTER (OUTPATIENT)
Age: 47
End: 2022-09-18

## 2022-10-28 ENCOUNTER — OFFICE VISIT (OUTPATIENT)
Dept: OPHTHALMOLOGY | Facility: CLINIC | Age: 47
End: 2022-10-28
Attending: OPHTHALMOLOGY
Payer: COMMERCIAL

## 2022-10-28 DIAGNOSIS — H35.352 CME (CYSTOID MACULAR EDEMA), LEFT: Primary | ICD-10-CM

## 2022-10-28 DIAGNOSIS — H35.012 RETINAL VASCULAR CHANGES, LEFT: ICD-10-CM

## 2022-10-28 PROCEDURE — G0463 HOSPITAL OUTPT CLINIC VISIT: HCPCS | Mod: 25

## 2022-10-28 PROCEDURE — 92134 CPTRZ OPH DX IMG PST SGM RTA: CPT | Performed by: OPHTHALMOLOGY

## 2022-10-28 PROCEDURE — 92015 DETERMINE REFRACTIVE STATE: CPT

## 2022-10-28 PROCEDURE — 250N000011 HC RX IP 250 OP 636: Performed by: OPHTHALMOLOGY

## 2022-10-28 PROCEDURE — 67028 INJECTION EYE DRUG: CPT | Mod: LT | Performed by: OPHTHALMOLOGY

## 2022-10-28 PROCEDURE — 92134 CPTRZ OPH DX IMG PST SGM RTA: CPT | Mod: 26 | Performed by: OPHTHALMOLOGY

## 2022-10-28 RX ADMIN — AFLIBERCEPT 2 MG: 40 INJECTION, SOLUTION INTRAVITREAL at 13:17

## 2022-10-28 ASSESSMENT — CONF VISUAL FIELD
OD_INFERIOR_TEMPORAL_RESTRICTION: 0
OS_INFERIOR_TEMPORAL_RESTRICTION: 0
OS_SUPERIOR_TEMPORAL_RESTRICTION: 0
OS_SUPERIOR_NASAL_RESTRICTION: 0
METHOD: COUNTING FINGERS
OS_NORMAL: 1
OD_INFERIOR_NASAL_RESTRICTION: 0
OD_SUPERIOR_TEMPORAL_RESTRICTION: 0
OD_SUPERIOR_NASAL_RESTRICTION: 0
OD_NORMAL: 1
OS_INFERIOR_NASAL_RESTRICTION: 0

## 2022-10-28 ASSESSMENT — TONOMETRY
OS_IOP_MMHG: 17
OD_IOP_MMHG: 19
IOP_METHOD: TONOPEN

## 2022-10-28 ASSESSMENT — REFRACTION_MANIFEST
OS_CYLINDER: +2.50
OS_AXIS: 047
OD_AXIS: 140
OD_ADD: +1.50
OD_SPHERE: -11.75
OS_ADD: +1.50
OD_CYLINDER: +2.25
OS_SPHERE: -13.25

## 2022-10-28 ASSESSMENT — SLIT LAMP EXAM - LIDS
COMMENTS: NORMAL
COMMENTS: NORMAL

## 2022-10-28 ASSESSMENT — VISUAL ACUITY
METHOD: SNELLEN - LINEAR
OS_CC+: -1
OD_CC: 20/25
OS_CC: 20/60
CORRECTION_TYPE: GLASSES

## 2022-10-28 ASSESSMENT — REFRACTION_WEARINGRX
OD_SPHERE: -11.75
OS_CYLINDER: +2.50
OS_AXIS: 047
OD_CYLINDER: +2.25
OD_AXIS: 149
OS_SPHERE: -13.25
SPECS_TYPE: SVL

## 2022-10-28 ASSESSMENT — CUP TO DISC RATIO
OD_RATIO: 0.2
OS_RATIO: 0.2

## 2022-10-28 ASSESSMENT — EXTERNAL EXAM - LEFT EYE: OS_EXAM: NORMAL

## 2022-10-28 ASSESSMENT — EXTERNAL EXAM - RIGHT EYE: OD_EXAM: NORMAL

## 2022-10-28 NOTE — NURSING NOTE
Chief Complaints and History of Present Illnesses   Patient presents with     Cystoid Macular Edema Follow Up     Chief Complaint(s) and History of Present Illness(es)     Cystoid Macular Edema Follow Up            Laterality: left eye    Onset: gradual    Course: stable    Associated symptoms: floaters.  Negative for eye pain and flashes    Treatments tried: no treatments          Comments    Here for CME left eye. Vision is stable. Floaters are stable without flashes. No eye pain.    Duran Ku COT 11:42 AM October 28, 2022

## 2022-10-28 NOTE — PROGRESS NOTES
"HPI: Zach Zaragoza is a 46 year old male here for 14 week follow up for scheduled Eylea left eye for cystoid macular edema. He reports that his vision seems stable since last visit. Floaters left eye are stable. No flashes.     Current ocular medications: None.  Prior ocular medications: Predforte (ineffective for cystoid macular edema).    Ocular history:   1. 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   -  sudden onset of blurry vision \"patchy\" left eye only. He was seen by an eye care provider in [place], told that he had \"burst capillaries\" in the back of his left eye, and treated with corticosteroid eye drops left eye. Mr. Zaragoza reports that his vision left eye seemed to return to normal after 2 weeks of eye drops, but he did not follow up for eye care at that time. About [id number] he noticed that vision left eye was blurry again, but he did not seek eye care until Oct 2018 with Dr. Johnson and did not present to uveitis clinic until 2019.   -  S/p intravitreal Avastin left eye starting 10.16.19 (incomplete response), switched to Eylea 10.30.20  2. High myopia both eyes.  3. Vasovagal syncope following ocular injections.  4. No history of ocular trauma, surgery, or laser.    Medical history:  1. No chronic medication conditions.  2. Syncopal episode with emesis in 2018 -> unremarkable physical exam/workup at [facility].  3. Uses over the counter melatonin.  4. S/p wisdom teeth extraction ().    Family history: Positive for retinal vascular disease \"a blood vessel \" (GM), diabetes mellitus, cancer, arthritis. Negative for autoimmune disease.    Social history: Quit smoking in ; 1-2 alcoholic beverages per day; no IVDU. Works as a . Has never lived outside the [facility], but has traveled to [place], and [place]. Currently has a cat as a pet and previously had a dog.    Laboratory/Imaging Results:  18 complete blood count " (CBC) without diff and HIV normal/negative. Glucose mildly elevated at 108.  9.5.18 chest x-ray read as normal.    Ocular Imaging:  Macular OCT 10.28.22:  Right eye: within normal limits, stable.  Left eye: maintains foveal contour with minimal intraretinal cysts, much improved cystoid macular edema, hyperreflective intraretinal puncta decreased in size/number, stable mild attenuation of EZ centrally    Retinal nerve fiber layer OCT 7.19.19:  Right eye: mild vitreopapillary traction, no thinning, G106  Left eye: mild increase in nerve fiber layer thickness compared to right eye, G130    Optos fluorescein angiography 7.19.19:  Right eye: ?normal (poor patient cooperation)  Left eye: transit not visualized (poor patient cooperation) but both A/V filled at 30 sec, late petalloid macular leakage, late staining along large venuoles, peripheral point aneurysm-like leakage    Impression/Plan:  1. 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 Not consistent with diabetic retinopathy and blood glucose 108 in 2018 (likely not fasting test). Unlikely to be ocular ischemic syndrome.   - Obtain 2007 records from [place] if possible (but clinic that patient recalled did not have any records)   - Additional testing for an underlying inflammatory or systemic vascular etiology should be considered if the right eye develops similar retinal vascular changes, but observe for now   - Consider laser photocoagulation of peripheral aneurysms and areas of nonperfusion, again discussed with patient but he is not interested at this time    2. Cystoid macular edema left eye secondary to #1, likely chronic given ellipsoid zone attenuation on OCT and history. Cystoid macular edema still present but improved after switching to Eylea given  incomplete response to monthly Avastin. Previously had increased cystoid macular edema 4 months s/p Eylea left eye but today doing well 14 weeks s/p most recent Eylea left eye.   -  S/p intravitreal Avastin left eye 10.16.19, 12.20.19, 4.10.20, 7.3.20, 8.14.20, 9.18.20   - S/p intravitreal Eylea left eye 10.30.20, 12.1.20, 1.29.21, 3.26.21, 6.18.21, 9.24.21, 1.28.22, 4.15.22, 7.22.22, 11.4.22   - Eylea #10 left eye given today, will extend interval by 2 weeks given improved macular edema on OCT today    3. Recurrent vasovagal syncope after injection; did not occur today. Wife not able to be present today. Zach drank a bottle of water prior to injection.   - Recommend that Zach drink water just prior to additional injections and if possible have wife be present    Return to uveitis clinic V/T/D/mac OCT/Eylea left eye in 16 weeks    Attending Physician Attestation:  Complete documentation of historical and exam elements from today's encounter can be found in the full encounter summary report (not reduplicated in this progress note).  I personally obtained the chief complaint(s) and history of present illness.  I confirmed and edited as necessary the review of systems, past medical/surgical history, family history, social history, and examination findings as documented by others; and I examined the patient myself.  I personally reviewed the relevant tests, images, and reports as documented above.  I formulated and edited as necessary the assessment and plan and discussed the findings and management plan with the patient and family.  - Wanda Duggan M.D.

## 2023-03-10 ENCOUNTER — OFFICE VISIT (OUTPATIENT)
Dept: OPHTHALMOLOGY | Facility: CLINIC | Age: 48
End: 2023-03-10
Attending: OPHTHALMOLOGY
Payer: COMMERCIAL

## 2023-03-10 DIAGNOSIS — H35.012 RETINAL VASCULAR CHANGES, LEFT: ICD-10-CM

## 2023-03-10 DIAGNOSIS — H35.352 CME (CYSTOID MACULAR EDEMA), LEFT: Primary | ICD-10-CM

## 2023-03-10 PROCEDURE — 67028 INJECTION EYE DRUG: CPT | Mod: LT | Performed by: OPHTHALMOLOGY

## 2023-03-10 PROCEDURE — G0463 HOSPITAL OUTPT CLINIC VISIT: HCPCS | Mod: 25 | Performed by: OPHTHALMOLOGY

## 2023-03-10 PROCEDURE — 250N000011 HC RX IP 250 OP 636: Performed by: OPHTHALMOLOGY

## 2023-03-10 PROCEDURE — 92134 CPTRZ OPH DX IMG PST SGM RTA: CPT | Performed by: OPHTHALMOLOGY

## 2023-03-10 RX ADMIN — AFLIBERCEPT 2 MG: 40 INJECTION, SOLUTION INTRAVITREAL at 12:14

## 2023-03-10 ASSESSMENT — CONF VISUAL FIELD
OS_INFERIOR_TEMPORAL_RESTRICTION: 0
OS_SUPERIOR_TEMPORAL_RESTRICTION: 0
OD_SUPERIOR_NASAL_RESTRICTION: 0
OD_NORMAL: 1
OS_INFERIOR_NASAL_RESTRICTION: 0
OD_INFERIOR_NASAL_RESTRICTION: 0
OS_NORMAL: 1
OD_SUPERIOR_TEMPORAL_RESTRICTION: 0
OD_INFERIOR_TEMPORAL_RESTRICTION: 0
METHOD: COUNTING FINGERS
OS_SUPERIOR_NASAL_RESTRICTION: 0

## 2023-03-10 ASSESSMENT — TONOMETRY
IOP_METHOD: TONOPEN
OS_IOP_MMHG: 16
OD_IOP_MMHG: 17

## 2023-03-10 ASSESSMENT — REFRACTION_WEARINGRX
OD_AXIS: 149
OS_AXIS: 047
OD_SPHERE: -11.75
SPECS_TYPE: SVL
OS_CYLINDER: +2.50
OS_SPHERE: -13.25
OD_CYLINDER: +2.25

## 2023-03-10 ASSESSMENT — VISUAL ACUITY
OS_CC: 20/60
OS_PH_CC+: +2
OD_CC+: .
METHOD: SNELLEN - LINEAR
CORRECTION_TYPE: GLASSES
OD_CC: 20/25
OS_CC+: -1
OS_PH_CC: 20/60

## 2023-03-10 ASSESSMENT — SLIT LAMP EXAM - LIDS
COMMENTS: NORMAL
COMMENTS: NORMAL

## 2023-03-10 ASSESSMENT — EXTERNAL EXAM - LEFT EYE: OS_EXAM: NORMAL

## 2023-03-10 ASSESSMENT — EXTERNAL EXAM - RIGHT EYE: OD_EXAM: NORMAL

## 2023-03-10 ASSESSMENT — CUP TO DISC RATIO
OD_RATIO: 0.2
OS_RATIO: 0.2

## 2023-03-10 NOTE — PROGRESS NOTES
"HPI: Zach Zaragoza is a 47 year old male here for 19 week follow up (supposed to be 16 week f/u but had to have Eylea approved by insurance) for cystoid macular edema left eye. He reports that his vision seems stable since last visit. Perhaps a few more floaters left eye since last visit, but may be within his baseline. No flashes.     Current ocular medications: None.  Prior ocular medications: Predforte (ineffective for cystoid macular edema).    Ocular history:   1. 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   -  sudden onset of blurry vision \"patchy\" left eye only. He was seen by an eye care provider in [place], told that he had \"burst capillaries\" in the back of his left eye, and treated with corticosteroid eye drops left eye. Mr. Zaragoza reports that his vision left eye seemed to return to normal after 2 weeks of eye drops, but he did not follow up for eye care at that time. About [id number] he noticed that vision left eye was blurry again, but he did not seek eye care until Oct 2018 with Dr. Johnson and did not present to uveitis clinic until 2019.   -  S/p intravitreal Avastin left eye starting 10.16.19 (incomplete response), switched to Eylea 10.30.  2. High myopia both eyes.  3. Vasovagal syncope following ocular injections.  4. No history of ocular trauma, surgery, or laser.    Medical history:  1. No chronic medication conditions.  2. Syncopal episode with emesis in 2018 -> unremarkable physical exam/workup at [facility] [place].  3. Uses over the counter melatonin.  4. S/p wisdom teeth extraction ().    Family history: Positive for retinal vascular disease \"a blood vessel \" (), diabetes mellitus, cancer, arthritis. Negative for autoimmune disease.    Social history: Quit smoking in ; 1-2 alcoholic beverages per day; no IVDU. Works as a . Has never lived outside the [facility], but has traveled to [place], [place], and [place]. " Currently has a cat as a pet and previously had a dog.    Laboratory/Imaging Results:  9.20.18 complete blood count (CBC) without diff and HIV normal/negative. Glucose mildly elevated at 108.  9.5.18 chest x-ray read as normal.    Ocular Imaging:  Macular OCT 3.10.23:  Right eye: within normal limits, stable.  Left eye: recurrence of moderate cystoid macular edema with loss of foveal contour, hyperreflective intraretinal puncta, stable mild attenuation of EZ centrally    Retinal nerve fiber layer OCT 7.19.19:  Right eye: mild vitreopapillary traction, no thinning, G106  Left eye: mild increase in nerve fiber layer thickness compared to right eye, G130    Optos fluorescein angiography 7.19.19:  Right eye: ?normal (poor patient cooperation)  Left eye: transit not visualized (poor patient cooperation) but both A/V filled at 30 sec, late petalloid macular leakage, late staining along large venuoles, peripheral point aneurysm-like leakage    Impression/Plan:  1. 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 Not consistent with diabetic retinopathy and blood glucose 108 in 2018 (likely not fasting test). Unlikely to be ocular ischemic syndrome.   - Obtain 2007 records from [place] if possible (but clinic that patient recalled did not have any records)   - Additional testing for an underlying inflammatory or systemic vascular etiology should be considered if the right eye develops similar retinal vascular changes, but observe for now   - Consider laser photocoagulation of peripheral aneurysms and areas of nonperfusion, again discussed with patient but he is not interested at this time    2. Cystoid macular edema left eye secondary to #1, likely chronic given ellipsoid zone attenuation on OCT and history. Cystoid  macular edema still present but improved after switching to Eylea given incomplete response to monthly Avastin. Previously was doing well 14 weeks s/p most recent Eylea left eye but today there is cystoid macular edema recurrence at 19 weeks.   -  S/p intravitreal Avastin left eye 10.16.19, 12.20.19, 4.10.20, 7.3.20, 8.14.20, 9.18.20   - S/p intravitreal Eylea left eye 10.30.20, 12.1.20, 1.29.21, 3.26.21, 6.18.21, 9.24.21, 1.28.22, 4.15.22, 7.22.22, 10.28.22, 3.10.23   - Eylea #11 left eye given today, decrease interval to 16 weeks    3. Recurrent vasovagal syncope after injection; did not occur today. Wife able to be present today.   - Continue to recommend that Zach hydrate just prior to additional injections and if possible have wife be present    Return to uveitis clinic V/T/D/mac OCT/Eylea left eye in 16 weeks    Attending Physician Attestation:  Complete documentation of historical and exam elements from today's encounter can be found in the full encounter summary report (not reduplicated in this progress note).  I personally obtained the chief complaint(s) and history of present illness.  I confirmed and edited as necessary the review of systems, past medical/surgical history, family history, social history, and examination findings as documented by others; and I examined the patient myself.  I personally reviewed the relevant tests, images, and reports as documented above.  I formulated and edited as necessary the assessment and plan and discussed the findings and management plan with the patient and family.  - Wanda Duggan M.D.

## 2023-03-10 NOTE — NURSING NOTE
Chief Complaints and History of Present Illnesses   Patient presents with     Follow Up     CME (cystoid macular edema), left     Chief Complaint(s) and History of Present Illness(es)     Follow Up            Comments: CME (cystoid macular edema), left          Comments    Pt states no change in [place] since last visit  Pt states floaters same as last visit   No flashes, eye pain or redness    Sindy Martinez COT 11:29 AM March 10, 2023

## 2023-06-30 ENCOUNTER — OFFICE VISIT (OUTPATIENT)
Dept: OPHTHALMOLOGY | Facility: CLINIC | Age: 48
End: 2023-06-30
Attending: OPHTHALMOLOGY
Payer: COMMERCIAL

## 2023-06-30 DIAGNOSIS — H35.012 RETINAL VASCULAR CHANGES, LEFT: ICD-10-CM

## 2023-06-30 DIAGNOSIS — H35.352 CME (CYSTOID MACULAR EDEMA), LEFT: Primary | ICD-10-CM

## 2023-06-30 PROCEDURE — G0463 HOSPITAL OUTPT CLINIC VISIT: HCPCS | Mod: 25 | Performed by: OPHTHALMOLOGY

## 2023-06-30 PROCEDURE — 92134 CPTRZ OPH DX IMG PST SGM RTA: CPT | Performed by: OPHTHALMOLOGY

## 2023-06-30 PROCEDURE — 250N000011 HC RX IP 250 OP 636: Mod: JZ | Performed by: OPHTHALMOLOGY

## 2023-06-30 PROCEDURE — 67028 INJECTION EYE DRUG: CPT | Mod: LT | Performed by: OPHTHALMOLOGY

## 2023-06-30 RX ORDER — ALBUTEROL SULFATE 90 UG/1
1-2 AEROSOL, METERED RESPIRATORY (INHALATION)
COMMUNITY
Start: 2021-08-16

## 2023-06-30 RX ADMIN — AFLIBERCEPT 2 MG: 40 INJECTION, SOLUTION INTRAVITREAL at 12:43

## 2023-06-30 ASSESSMENT — VISUAL ACUITY
METHOD: SNELLEN - LINEAR
CORRECTION_TYPE: GLASSES
OD_CC: 20/25
OS_CC: 20/60
OS_CC+: -2

## 2023-06-30 ASSESSMENT — TONOMETRY
IOP_METHOD: ICARE
OD_IOP_MMHG: 18
OS_IOP_MMHG: 12

## 2023-06-30 ASSESSMENT — EXTERNAL EXAM - LEFT EYE: OS_EXAM: NORMAL

## 2023-06-30 ASSESSMENT — CUP TO DISC RATIO
OD_RATIO: 0.2
OS_RATIO: 0.2

## 2023-06-30 ASSESSMENT — SLIT LAMP EXAM - LIDS
COMMENTS: NORMAL
COMMENTS: NORMAL

## 2023-06-30 ASSESSMENT — EXTERNAL EXAM - RIGHT EYE: OD_EXAM: NORMAL

## 2023-06-30 NOTE — PROGRESS NOTES
"HPI: Zach Zaragoza is a 47 year old male here for 16 week follow up for cystoid macular edema left eye. He reports that his vision seems stable since last visit, has temporal blind spot with visual acuity testing here. Again, perhaps a few more floaters left eye since last visit, but may be within his baseline. No flashes. No eye pain or irritation.    Current ocular medications: None.  Prior ocular medications: Predforte (ineffective for cystoid macular edema).    Ocular history:   1. 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   -  sudden onset of blurry vision \"patchy\" left eye only. He was seen by an eye care provider in [place], told that he had \"burst capillaries\" in the back of his left eye, and treated with corticosteroid eye drops left eye. Mr. Zaragoza reports that his vision left eye seemed to return to normal after 2 weeks of eye drops, but he did not follow up for eye care at that time. About [id number] he noticed that vision left eye was blurry again, but he did not seek eye care until Oct 2018 with Dr. Johnson and did not present to uveitis clinic until 2019.   -  S/p intravitreal Avastin left eye starting 10.16.19 (incomplete response), switched to Eylea 10..20  2. High myopia both eyes.  3. Vasovagal syncope following ocular injections.  4. No history of ocular trauma, surgery, or laser.    Medical history:  1. No chronic medication conditions.  2. Syncopal episode with emesis in 2018 -> unremarkable physical exam/workup at [facility].  3. Uses over the counter melatonin.  4. S/p wisdom teeth extraction ().    Family history: Positive for retinal vascular disease \"a blood vessel \" (), diabetes mellitus, cancer, arthritis. Negative for autoimmune disease.    Social history: Quit smoking in ; 1-2 alcoholic beverages per day; no IVDU. Works as a . [place] , but [place], [place] and [place]. Currently has a cat as a pet and previously had a dog.    Laboratory/Imaging Results:  9.20.18 complete blood count (CBC) without diff and HIV normal/negative. Glucose mildly elevated at 108.  9.5.18 chest x-ray read as normal.    Ocular Imaging:  Macular OCT 6.30.23:  Right eye: within normal limits, stable.  Left eye: improved but still mild cystoid macular edema with improvement of foveal contour (still with central cysts), hyperreflective intraretinal puncta, stable mild attenuation of EZ centrally    Retinal nerve fiber layer OCT 7.19.19:  Right eye: mild vitreopapillary traction, no thinning, G106  Left eye: mild increase in nerve fiber layer thickness compared to right eye, G130    Optos fluorescein angiography 7.19.19:  Right eye: ?normal (poor patient cooperation)  Left eye: transit not visualized (poor patient cooperation) but both A/V filled at 30 sec, late petalloid macular leakage, late staining along large venuoles, peripheral point aneurysm-like leakage    Impression/Plan:  1. 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 Not consistent with diabetic retinopathy and blood glucose 108 in 2018 (likely not fasting test). Unlikely to be ocular ischemic syndrome.   - Obtain 2007 records from [place] if possible (but clinic that patient recalled did not have any records)   - Additional testing for an underlying inflammatory or systemic vascular etiology should be considered if the right eye develops similar retinal vascular changes, but observe for now   - Consider laser photocoagulation of peripheral aneurysms and areas of nonperfusion, again discussed with patient but he is not interested at this time    2. Cystoid macular edema left eye secondary to #1, likely chronic given ellipsoid zone  attenuation on OCT and history. Cystoid macular edema still present but improved after switching to Eylea given incomplete response to monthly Avastin. Previously there was cystoid macular edema recurrence at 19 week Eylea interval; today there is still some cystoid macular edema at 16 weeks.   -  S/p intravitreal Avastin left eye 10.16.19, 12.20.19, 4.10.20, 7.3.20, 8.14.20, 9.18.20   - S/p intravitreal Eylea left eye 10.30.20, 12.1.20, 1.29.21, 3.26.21, 6.18.21, 9.24.21, 1.28.22, 4.15.22, 7.22.22, 10.28.22, 3.10.23, 6.20.23   - Eylea #12 left eye given today; decrease Eylea interval to 14 weeks    3. Recurrent vasovagal syncope after injection; did not occur today. Wife able to be present today.   - Continue to recommend that Zach hydrate just prior to additional injections and if possible have wife be present    Return to uveitis clinic V/T/D/mac OCT/Eylea left eye in 14 weeks    Attending Physician Attestation:  Complete documentation of historical and exam elements from today's encounter can be found in the full encounter summary report (not reduplicated in this progress note).  I personally obtained the chief complaint(s) and history of present illness.  I confirmed and edited as necessary the review of systems, past medical/surgical history, family history, social history, and examination findings as documented by others; and I examined the patient myself.  I personally reviewed the relevant tests, images, and reports as documented above.  I formulated and edited as necessary the assessment and plan and discussed the findings and management plan with the patient and family.  - Wanda Duggan M.D.

## 2023-06-30 NOTE — NURSING NOTE
Chief Complaints and History of Present Illnesses   Patient presents with     Follow Up     Pt here for 3month follow up on CME left eye with injection.      Chief Complaint(s) and History of Present Illness(es)     Follow Up            Laterality: left eye    Associated symptoms: Negative for eye pain and headache    Comments: Pt here for 3month follow up on CME left eye with injection.           Comments    Pt last injection of Eylea left eye 03/10/2023. Vision is unchanged since last exam. Pt thinks he may have new floaters left eye denies flashes.   Haley Plzaa, COA on 6/30/2023 at 11:27 AM

## 2023-07-30 ENCOUNTER — HEALTH MAINTENANCE LETTER (OUTPATIENT)
Age: 48
End: 2023-07-30

## 2023-10-13 ENCOUNTER — OFFICE VISIT (OUTPATIENT)
Dept: OPHTHALMOLOGY | Facility: CLINIC | Age: 48
End: 2023-10-13
Attending: OPHTHALMOLOGY
Payer: COMMERCIAL

## 2023-10-13 DIAGNOSIS — H35.352 CME (CYSTOID MACULAR EDEMA), LEFT: Primary | ICD-10-CM

## 2023-10-13 DIAGNOSIS — H35.012 RETINAL VASCULAR CHANGES, LEFT: ICD-10-CM

## 2023-10-13 PROCEDURE — 92134 CPTRZ OPH DX IMG PST SGM RTA: CPT | Mod: 26 | Performed by: OPHTHALMOLOGY

## 2023-10-13 PROCEDURE — 67028 INJECTION EYE DRUG: CPT | Mod: LT | Performed by: OPHTHALMOLOGY

## 2023-10-13 PROCEDURE — 99214 OFFICE O/P EST MOD 30 MIN: CPT | Mod: 25 | Performed by: OPHTHALMOLOGY

## 2023-10-13 PROCEDURE — 250N000011 HC RX IP 250 OP 636: Mod: JZ | Performed by: OPHTHALMOLOGY

## 2023-10-13 PROCEDURE — 92134 CPTRZ OPH DX IMG PST SGM RTA: CPT | Performed by: OPHTHALMOLOGY

## 2023-10-13 RX ADMIN — AFLIBERCEPT 2 MG: 40 INJECTION, SOLUTION INTRAVITREAL at 13:28

## 2023-10-13 ASSESSMENT — CONF VISUAL FIELD
OS_NORMAL: 1
OD_NORMAL: 1
OS_SUPERIOR_NASAL_RESTRICTION: 0
OD_SUPERIOR_TEMPORAL_RESTRICTION: 0
OD_SUPERIOR_NASAL_RESTRICTION: 0
METHOD: COUNTING FINGERS
OD_INFERIOR_TEMPORAL_RESTRICTION: 0
OS_INFERIOR_TEMPORAL_RESTRICTION: 0
OS_INFERIOR_NASAL_RESTRICTION: 0
OD_INFERIOR_NASAL_RESTRICTION: 0
OS_SUPERIOR_TEMPORAL_RESTRICTION: 0

## 2023-10-13 ASSESSMENT — CUP TO DISC RATIO
OD_RATIO: 0.2
OS_RATIO: 0.2

## 2023-10-13 ASSESSMENT — VISUAL ACUITY
CORRECTION_TYPE: GLASSES
METHOD: SNELLEN - LINEAR
OS_CC: 20/60
OD_CC: 20/25
OD_CC+: -2
OS_CC+: +1

## 2023-10-13 ASSESSMENT — EXTERNAL EXAM - LEFT EYE: OS_EXAM: NORMAL

## 2023-10-13 ASSESSMENT — SLIT LAMP EXAM - LIDS
COMMENTS: NORMAL
COMMENTS: NORMAL

## 2023-10-13 ASSESSMENT — REFRACTION_WEARINGRX
OD_AXIS: 149
OS_AXIS: 047
OD_CYLINDER: +2.25
SPECS_TYPE: SVL
OS_CYLINDER: +2.50
OS_SPHERE: -13.25
OD_SPHERE: -11.75

## 2023-10-13 ASSESSMENT — TONOMETRY
IOP_METHOD: TONOPEN
OS_IOP_MMHG: 16
OD_IOP_MMHG: 19

## 2023-10-13 ASSESSMENT — EXTERNAL EXAM - RIGHT EYE: OD_EXAM: NORMAL

## 2023-10-13 NOTE — NURSING NOTE
Chief Complaints and History of Present Illnesses   Patient presents with    Cystoid Macular Edema Follow Up     Last seen June 30, 2023.      Chief Complaint(s) and History of Present Illness(es)       Cystoid Macular Edema Follow Up              Laterality: left eye    Associated symptoms: Negative for double vision, eye pain, flashes and floaters    Treatments tried: no treatments    Pain scale: 0/10    Comments: Last seen June 30, 2023.               Comments    Pt reports no noticeable changes in vision since last seen, right eye appears stable. Left eye consistently blurry.   Denies new floaters/flashes/pain. BE  Is not using any eye drops at this time.     Mean Grey OA, October 13, 2023

## 2023-10-13 NOTE — PROGRESS NOTES
"HPI: Zach Zaragoza is a 47 year old male here for 15 week follow up for cystoid macular edema left eye. He reports that his vision seems stable since last visit; stable temporal blind spot left eye with visual acuity testing. Floaters are chronic and stable. No flashes. No eye pain or irritation.    Current ocular medications: None.  Prior ocular medications: Predforte (ineffective for cystoid macular edema).    Ocular history:   1. 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   -  sudden onset of blurry vision \"patchy\" left eye only. He was seen by an eye care provider in [place], told that he had \"burst capillaries\" in the back of his left eye, and treated with corticosteroid eye drops left eye. Mr. Zaragoza reports that his vision left eye seemed to return to normal after 2 weeks of eye drops, but he did not follow up for eye care at that time. About 5823-2138 he noticed that vision left eye was blurry again, but he did not seek eye care until Oct 2018 with Dr. Johnson and did not present to uveitis clinic until 2019.   -  S/p intravitreal Avastin left eye starting 10.16.19 (incomplete response), switched to Eylea 10.30.20  2. High myopia both eyes.  3. Vasovagal syncope following ocular injections.  4. No history of ocular trauma, surgery, or laser.    Medical history:  1. No chronic medication conditions.  2. Syncopal episode with emesis in 2018 -> [place]remarkable physical exam[place] [facility] [place]  3. Uses over the counter melatonin.  4. S/p wisdom teeth extraction ().    Family history: Positive for retinal vascular disease \"a blood vessel \" (), diabetes mellitus, cancer, arthritis. Negative for autoimmune disease.    Social history: Quit smoking in ; 1-2 alcoholic beverages per day; no IVDU. Works as a . Has never lived outside the [facility], but has traveled to [place].    Laboratory/Imaging Results:  18 " complete blood count (CBC) without diff and HIV normal/negative. Glucose mildly elevated at 108.  9.5.18 chest x-ray read as normal.    Ocular Imaging:  Macular OCT 10.13.23:  Right eye: within normal limits, stable.  Left eye: again improved but mild cystoid macular edema still present, with improvement of foveal contour (still with central cysts), hyperreflective intraretinal puncta, stable mild attenuation of EZ centrally    Retinal nerve fiber layer OCT 7.19.19:  Right eye: mild vitreopapillary traction, no thinning, G106  Left eye: mild increase in nerve fiber layer thickness compared to right eye, G130    Optos fluorescein angiography 7.19.19:  Right eye: ?normal (poor patient cooperation)  Left eye: transit not visualized (poor patient cooperation) but both A/V filled at 30 sec, late petalloid macular leakage, late staining along large venuoles, peripheral point aneurysm-like leakage    Impression/Plan:  1. 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 Not consistent with diabetic retinopathy and blood glucose 108 in 2018 (likely not fasting test). Unlikely to be ocular ischemic syndrome.   - Obtain 2007 records from [place] if possible (but clinic that patient recalled did not have any records)   - Additional testing for an underlying inflammatory or systemic vascular etiology should be considered if the right eye develops similar retinal vascular changes, but observe for now   - Consider laser photocoagulation of peripheral aneurysms and areas of nonperfusion, again discussed with patient but he is not interested at this time    2. Cystoid macular edema left eye secondary to #1, likely chronic given ellipsoid zone attenuation on OCT and history. Cystoid macular edema still present but improved after  switching to Eylea given incomplete response to monthly Avastin. Previously there was cystoid macular edema recurrence at 19 week Eylea interval; today there is still some cystoid macular edema at 16 weeks.   -  S/p intravitreal Avastin left eye 10.16.19, 12.20.19, 4.10.20, 7.3.20, 8.14.20, 9.18.20   - S/p intravitreal Eylea left eye 10.30.20, 12.1.20, 1.29.21, 3.26.21, 6.18.21, 9.24.21, 1.28.22, 4.15.22, 7.22.22, 10.28.22, 3.10.23, 6.20.23, 10.13.23   - Eylea #13 left eye given today; decrease Eylea interval to 13-4 weeks    3. Recurrent vasovagal syncope after injection; did not occur today. Wife able to be present today.   - Continue to recommend that Zach hydrate just prior to additional injections and if possible have wife be present    Return to uveitis clinic V/T/D/mac OCT/Eylea left eye in 13-14 weeks    Attending Physician Attestation:  Complete documentation of historical and exam elements from today's encounter can be found in the full encounter summary report (not reduplicated in this progress note).  I personally obtained the chief complaint(s) and history of present illness.  I confirmed and edited as necessary the review of systems, past medical/surgical history, family history, social history, and examination findings as documented by others; and I examined the patient myself.  I personally reviewed the relevant tests, images, and reports as documented above.  I formulated and edited as necessary the assessment and plan and discussed the findings and management plan with the patient and family.  - Wanda Duggan M.D.

## 2024-01-06 NOTE — NURSING NOTE
Chief Complaints and History of Present Illnesses   Patient presents with     Follow Up     Retinal vascular changes     Chief Complaint(s) and History of Present Illness(es)     Follow Up     Laterality: left eye    Associated symptoms: floaters (stable).  Negative for dryness, eye pain, redness and tearing    Pain scale: 0/10    Comments: Retinal vascular changes              Comments     He states that his vision has seemed stable in both eyes, since his last eye exam.   Patient denies having any eye discomfort.     Tarsha De La Paz, COT 2:11 PM  January 24, 2020                    Yes

## 2024-01-19 ENCOUNTER — OFFICE VISIT (OUTPATIENT)
Dept: OPHTHALMOLOGY | Facility: CLINIC | Age: 49
End: 2024-01-19
Attending: OPHTHALMOLOGY
Payer: COMMERCIAL

## 2024-01-19 DIAGNOSIS — H35.012 RETINAL VASCULAR CHANGES, LEFT: ICD-10-CM

## 2024-01-19 DIAGNOSIS — H35.352 CME (CYSTOID MACULAR EDEMA), LEFT: Primary | ICD-10-CM

## 2024-01-19 PROCEDURE — 250N000011 HC RX IP 250 OP 636: Mod: JZ | Performed by: OPHTHALMOLOGY

## 2024-01-19 PROCEDURE — 92134 CPTRZ OPH DX IMG PST SGM RTA: CPT | Mod: 26 | Performed by: OPHTHALMOLOGY

## 2024-01-19 PROCEDURE — 67028 INJECTION EYE DRUG: CPT | Mod: LT | Performed by: OPHTHALMOLOGY

## 2024-01-19 PROCEDURE — 92134 CPTRZ OPH DX IMG PST SGM RTA: CPT | Performed by: OPHTHALMOLOGY

## 2024-01-19 PROCEDURE — 99214 OFFICE O/P EST MOD 30 MIN: CPT | Mod: 25 | Performed by: OPHTHALMOLOGY

## 2024-01-19 PROCEDURE — 99213 OFFICE O/P EST LOW 20 MIN: CPT | Mod: 25 | Performed by: OPHTHALMOLOGY

## 2024-01-19 RX ADMIN — AFLIBERCEPT 2 MG: 40 INJECTION, SOLUTION INTRAVITREAL at 13:56

## 2024-01-19 ASSESSMENT — TONOMETRY
IOP_METHOD: TONOPEN
OD_IOP_MMHG: 15
OS_IOP_MMHG: 17

## 2024-01-19 ASSESSMENT — VISUAL ACUITY
OS_CC: 20/60
CORRECTION_TYPE: GLASSES
OD_CC: 20/25
OD_CC+: -3
OS_CC+: -2+1
METHOD: SNELLEN - LINEAR

## 2024-01-19 ASSESSMENT — CUP TO DISC RATIO
OS_RATIO: 0.2
OD_RATIO: 0.2

## 2024-01-19 ASSESSMENT — REFRACTION_WEARINGRX
OS_AXIS: 047
OS_SPHERE: -13.25
OD_SPHERE: -11.75
OD_AXIS: 149
OD_CYLINDER: +2.25
SPECS_TYPE: SVL
OS_CYLINDER: +2.50

## 2024-01-19 ASSESSMENT — SLIT LAMP EXAM - LIDS
COMMENTS: NORMAL
COMMENTS: NORMAL

## 2024-01-19 ASSESSMENT — EXTERNAL EXAM - LEFT EYE: OS_EXAM: NORMAL

## 2024-01-19 ASSESSMENT — EXTERNAL EXAM - RIGHT EYE: OD_EXAM: NORMAL

## 2024-01-19 NOTE — PROGRESS NOTES
"HPI: Zach Zaragoza is a 48 year old male here for 14 week follow up for cystoid macular edema left eye. He reports that his vision seems stable since last visit; stable temporal blind spot left eye with visual acuity testing. Floaters are chronic and stable. No flashes. No eye pain or irritation.    Current ocular medications: None.  Prior ocular medications: Predforte (ineffective for cystoid macular edema).    Ocular history:   1. 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   -  sudden onset of blurry vision \"patchy\" left eye only. He was seen by an eye care provider in [place], told that he had \"burst capillaries\" in the back of his left eye, and treated with corticosteroid eye drops left eye. Mr. Zaragoza reports that his vision left eye seemed to return to normal after 2 weeks of eye drops, but he did not follow up for eye care at that time. About 4546-0138 he noticed that vision left eye was blurry again, but he did not seek eye care until Oct 2018 with Dr. Johnson and did not present to uveitis clinic until 2019.   -  S/p intravitreal Avastin left eye starting 10.16.19 (incomplete response), switched to Eylea 10.30.20  2. High myopia both eyes.  3. Vasovagal syncope following ocular injections.  4. No history of ocular trauma, surgery, or laser.    Medical history:  1. No chronic medication conditions.  2. Syncopal episode with emesis in 2018 -> unremarkable physical exam/workup at [facility] [place].  3. Uses over the counter melatonin.  4. S/p wisdom teeth extraction ().    Family history: Positive for retinal vascular disease \"a blood vessel \" (), diabetes mellitus, cancer, arthritis. Negative for autoimmune disease.    Social history: Quit smoking in ; 1-2 alcoholic beverages per day; no IVDU. Works as a . Has never lived outside the [facility], but has [place]    Laboratory/Imaging Results:  18 " complete blood count (CBC) without diff and HIV normal/negative. Glucose mildly elevated at 108.  9.5.18 chest x-ray read as normal.    Ocular Imaging:  Macular OCT 1.19.24:  Right eye: within normal limits, stable.  Left eye: substantial improved with much better definition of foveal contour, still with mild residual cystoid macular edema, hyperreflective intraretinal puncta, stable mild attenuation of EZ centrally    Retinal nerve fiber layer OCT 7.19.19:  Right eye: mild vitreopapillary traction, no thinning, G106  Left eye: mild increase in nerve fiber layer thickness compared to right eye, G130    Optos fluorescein angiography 7.19.19:  Right eye: ?normal (poor patient cooperation)  Left eye: transit not visualized (poor patient cooperation) but both A/V filled at 30 sec, late petalloid macular leakage, late staining along large venuoles, peripheral point aneurysm-like leakage    Impression/Plan:  1. 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 Not consistent with diabetic retinopathy and blood glucose 108 in 2018 (likely not fasting test). Unlikely to be ocular ischemic syndrome.   - Obtain 2007 records from [place] if possible (but clinic that patient recalled did not have any records)   - Additional testing for an underlying inflammatory or systemic vascular etiology should be considered if the right eye develops similar retinal vascular changes, but observe for now   - Consider laser photocoagulation of peripheral aneurysms and areas of nonperfusion, again discussed with patient but he is not interested at this time    2. Cystoid macular edema left eye secondary to #1, likely chronic given ellipsoid zone attenuation on OCT and history. Cystoid macular edema still present but improved after switching to  Eylea given incomplete response to monthly Avastin. Today, cystoid macular edema is adequately controlled at 14 week Eylea interval.   -  S/p intravitreal Avastin left eye 10.16.19, 12.20.19, 4.10.20, 7.3.20, 8.14.20, 9.18.20   - S/p intravitreal Eylea left eye 10.30.20, 12.1.20, 1.29.21, 3.26.21, 6.18.21, 9.24.21, 1.28.22, 4.15.22, 7.22.22, 10.28.22, 3.10.23, 6.20.23, 10.13.23, 1.19.24   - Eylea left eye given today; continue Eylea interval at 13-4 weeks    3. Recurrent vasovagal syncope after injection; did not occur today. Wife able to be present today.   - Continue to recommend that Zach hydrate just prior to additional injections and if possible have wife be present    Return to uveitis clinic V/T/D/mac OCT/Eylea left eye in 13-14 weeks    Attending Physician Attestation:  Complete documentation of historical and exam elements from today's encounter can be found in the full encounter summary report (not reduplicated in this progress note).  I personally obtained the chief complaint(s) and history of present illness.  I confirmed and edited as necessary the review of systems, past medical/surgical history, family history, social history, and examination findings as documented by others; and I examined the patient myself.  I personally reviewed the relevant tests, images, and reports as documented above.  I formulated and edited as necessary the assessment and plan and discussed the findings and management plan with the patient and family.  - Wanda Duggan M.D.

## 2024-01-19 NOTE — NURSING NOTE
Chief Complaints and History of Present Illnesses   Patient presents with    Cystoid Macular Edema Follow Up     3 month follow up CME left eye      Chief Complaint(s) and History of Present Illness(es)       Cystoid Macular Edema Follow Up              Associated symptoms: Negative for dryness, eye pain, flashes and floaters    Treatments tried: no treatments    Pain scale: 0/10    Comments: 3 month follow up CME left eye               Comments    Pt states vision has been stable since last visit.   No new flashes or floaters.   Occasional dryness each eye about once a month   Denies using eyedrops.     Bryson Guthrie 11:32 AM January 19, 2024

## 2024-04-26 ENCOUNTER — OFFICE VISIT (OUTPATIENT)
Dept: OPHTHALMOLOGY | Facility: CLINIC | Age: 49
End: 2024-04-26
Attending: OPHTHALMOLOGY
Payer: COMMERCIAL

## 2024-04-26 DIAGNOSIS — H35.352 CME (CYSTOID MACULAR EDEMA), LEFT: Primary | ICD-10-CM

## 2024-04-26 DIAGNOSIS — H35.012 RETINAL VASCULAR CHANGES, LEFT: ICD-10-CM

## 2024-04-26 PROCEDURE — 99211 OFF/OP EST MAY X REQ PHY/QHP: CPT | Mod: 25 | Performed by: OPHTHALMOLOGY

## 2024-04-26 PROCEDURE — 99214 OFFICE O/P EST MOD 30 MIN: CPT | Mod: 25 | Performed by: OPHTHALMOLOGY

## 2024-04-26 PROCEDURE — 67028 INJECTION EYE DRUG: CPT | Mod: LT | Performed by: OPHTHALMOLOGY

## 2024-04-26 PROCEDURE — G0463 HOSPITAL OUTPT CLINIC VISIT: HCPCS | Mod: 25

## 2024-04-26 PROCEDURE — 92134 CPTRZ OPH DX IMG PST SGM RTA: CPT | Performed by: OPHTHALMOLOGY

## 2024-04-26 PROCEDURE — 250N000011 HC RX IP 250 OP 636: Mod: JZ | Performed by: OPHTHALMOLOGY

## 2024-04-26 RX ADMIN — AFLIBERCEPT 2 MG: 40 INJECTION, SOLUTION INTRAVITREAL at 14:17

## 2024-04-26 ASSESSMENT — VISUAL ACUITY
OS_CC: 20/60
CORRECTION_TYPE: GLASSES
OD_CC: 20/25
METHOD: SNELLEN - LINEAR

## 2024-04-26 ASSESSMENT — SLIT LAMP EXAM - LIDS
COMMENTS: NORMAL
COMMENTS: NORMAL

## 2024-04-26 ASSESSMENT — TONOMETRY
OD_IOP_MMHG: 14
IOP_METHOD: TONOPEN
OS_IOP_MMHG: 18

## 2024-04-26 ASSESSMENT — CUP TO DISC RATIO
OD_RATIO: 0.2
OS_RATIO: 0.2

## 2024-04-26 ASSESSMENT — EXTERNAL EXAM - LEFT EYE: OS_EXAM: NORMAL

## 2024-04-26 ASSESSMENT — EXTERNAL EXAM - RIGHT EYE: OD_EXAM: NORMAL

## 2024-04-26 NOTE — PROGRESS NOTES
"HPI: Zach Zaragoza is a 48 year old male here for 14 week follow up of cystoid macular edema left eye. He reports that he might have a few more floaters, unsure which eye though thinks it is in the left eye. No flashes. No eye pain or irritation.    Current ocular medications: None.  Prior ocular medications: Predforte (ineffective for cystoid macular edema).    Ocular history:   1. 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   -  sudden onset of blurry vision \"patchy\" left eye only. He was seen by an eye care provider in [place], told that he had \"burst capillaries\" in the back of his left eye, and treated with corticosteroid eye drops left eye. Mr. Zaragoza reports that his vision left eye seemed to return to normal after 2 weeks of eye drops, but he did not follow up for eye care at that time. About [id number] he noticed that vision left eye was blurry again, but he did not seek eye care until Oct 2018 with Dr. Johnson and did not present to uveitis clinic until 2019.   -  S/p intravitreal Avastin left eye starting 10.16.19 (incomplete response), switched to Eylea 10.30.20  2. High myopia both eyes.  3. Vasovagal syncope following ocular injections.  4. No history of ocular trauma, surgery, or laser.    Medical history:  1. No chronic medication conditions.  2. Syncopal episode with emesis in 2018 -> unremarkable physical exam/workup at [facility] [place].  3. Uses over the counter melatonin.  4. S/p wisdom teeth extraction ().    Family history: Positive for retinal vascular disease \"a blood vessel \" (), diabetes mellitus, cancer, arthritis. Negative for autoimmune disease.    Social history: Quit smoking in ; 1-2 alcoholic beverages per day; no IVDU. Works as a . Has never lived outside the [facility], but has traveled to [place], [place], and [place].    Laboratory/Imaging Results:  18 complete blood count (CBC) without diff and HIV " normal/negative. Glucose mildly elevated at 108.  9.5.18 chest x-ray read as normal.    Ocular Imaging:  Macular OCT 4.26.24:  Right eye: within normal limits, stable.  Left eye: good definition of foveal contour, still with mild residual noncentral cystoid macular edema, hyperreflective intraretinal puncta, stable mild attenuation of EZ centrally    Retinal nerve fiber layer OCT 7.19.19:  Right eye: mild vitreopapillary traction, no thinning, G106  Left eye: mild increase in nerve fiber layer thickness compared to right eye, G130    Optos fluorescein angiography 7.19.19:  Right eye: ?normal (poor patient cooperation)  Left eye: transit not visualized (poor patient cooperation) but both A/V filled at 30 sec, late petalloid macular leakage, late staining along large venuoles, peripheral point aneurysm-like leakage    Impression/Plan:  1. 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 Not consistent with diabetic retinopathy and blood glucose 108 in 2018 (likely not fasting test). Unlikely to be ocular ischemic syndrome.   - Additional testing for an underlying inflammatory or systemic vascular etiology should be considered if the right eye develops similar retinal vascular changes, but observe for now   - Could consider laser photocoagulation of peripheral aneurysms and areas of nonperfusion, again discussed with patient but he is not interested at this time    2. Cystoid macular edema left eye secondary to #1, likely chronic given ellipsoid zone attenuation on OCT and history. Cystoid macular edema still present but improved after switching to Eylea given incomplete response to monthly Avastin. Today, cystoid macular edema is adequately controlled at 14 week Eylea interval.   -  S/p intravitreal Avastin left eye  10.16.19, 12.20.19, 4.10.20, 7.3.20, 8.14.20, 9.18.20   - S/p intravitreal Eylea left eye 10.30.20, 12.1.20, 1.29.21, 3.26.21, 6.18.21, 9.24.21, 1.28.22, 4.15.22, 7.22.22, 10.28.22, 3.10.23, 6.20.23, 10.13.23, 1.19.24, 4.26.24   - Eylea left eye given today; continue Eylea interval at 13-14 weeks    3. Recurrent vasovagal syncope after injection; did not occur today. Wife able to be present today.   - Continue to recommend that Zach hydrate just prior to additional injections and if possible have wife be present    Return to uveitis clinic V/T/D/mac OCT/Eylea left eye in 13-14 weeks    Attending Physician Attestation:  Complete documentation of historical and exam elements from today's encounter can be found in the full encounter summary report (not reduplicated in this progress note).  I personally obtained the chief complaint(s) and history of present illness.  I confirmed and edited as necessary the review of systems, past medical/surgical history, family history, social history, and examination findings as documented by others; and I examined the patient myself.  I personally reviewed the relevant tests, images, and reports as documented above.  I formulated and edited as necessary the assessment and plan and discussed the findings and management plan with the patient and family.  - Wanda Duggan M.D.

## 2024-08-09 ENCOUNTER — OFFICE VISIT (OUTPATIENT)
Dept: OPHTHALMOLOGY | Facility: CLINIC | Age: 49
End: 2024-08-09
Attending: OPHTHALMOLOGY
Payer: COMMERCIAL

## 2024-08-09 DIAGNOSIS — H35.012 RETINAL VASCULAR CHANGES, LEFT: ICD-10-CM

## 2024-08-09 DIAGNOSIS — H35.352 CME (CYSTOID MACULAR EDEMA), LEFT: Primary | ICD-10-CM

## 2024-08-09 PROCEDURE — 67028 INJECTION EYE DRUG: CPT | Mod: LT | Performed by: OPHTHALMOLOGY

## 2024-08-09 PROCEDURE — 92134 CPTRZ OPH DX IMG PST SGM RTA: CPT | Mod: 26 | Performed by: OPHTHALMOLOGY

## 2024-08-09 PROCEDURE — 250N000011 HC RX IP 250 OP 636: Performed by: OPHTHALMOLOGY

## 2024-08-09 PROCEDURE — 99212 OFFICE O/P EST SF 10 MIN: CPT | Mod: 25 | Performed by: OPHTHALMOLOGY

## 2024-08-09 PROCEDURE — 92134 CPTRZ OPH DX IMG PST SGM RTA: CPT | Performed by: OPHTHALMOLOGY

## 2024-08-09 PROCEDURE — 99214 OFFICE O/P EST MOD 30 MIN: CPT | Mod: 25 | Performed by: OPHTHALMOLOGY

## 2024-08-09 RX ADMIN — AFLIBERCEPT 2 MG: 40 INJECTION, SOLUTION INTRAVITREAL at 15:04

## 2024-08-09 ASSESSMENT — VISUAL ACUITY
METHOD: SNELLEN - LINEAR
OD_CC: 20/25
CORRECTION_TYPE: GLASSES
OS_CC+: +3
OS_CC: 20/60

## 2024-08-09 ASSESSMENT — CONF VISUAL FIELD
OD_NORMAL: 1
OD_SUPERIOR_TEMPORAL_RESTRICTION: 0
OS_SUPERIOR_NASAL_RESTRICTION: 0
OS_NORMAL: 1
OD_INFERIOR_NASAL_RESTRICTION: 0
OD_SUPERIOR_NASAL_RESTRICTION: 0
OS_INFERIOR_TEMPORAL_RESTRICTION: 0
OS_SUPERIOR_TEMPORAL_RESTRICTION: 0
OS_INFERIOR_NASAL_RESTRICTION: 0
OD_INFERIOR_TEMPORAL_RESTRICTION: 0

## 2024-08-09 ASSESSMENT — TONOMETRY
OD_IOP_MMHG: 16
IOP_METHOD: TONOPEN
OS_IOP_MMHG: 18

## 2024-08-09 ASSESSMENT — REFRACTION_WEARINGRX
SPECS_TYPE: SVL
OD_CYLINDER: +2.25
OS_SPHERE: -13.25
OD_AXIS: 149
OD_SPHERE: -11.75
OS_AXIS: 047
OS_CYLINDER: +2.50

## 2024-08-09 ASSESSMENT — CUP TO DISC RATIO
OS_RATIO: 0.2
OD_RATIO: 0.2

## 2024-08-09 ASSESSMENT — EXTERNAL EXAM - LEFT EYE: OS_EXAM: NORMAL

## 2024-08-09 ASSESSMENT — SLIT LAMP EXAM - LIDS
COMMENTS: NORMAL
COMMENTS: NORMAL

## 2024-08-09 ASSESSMENT — EXTERNAL EXAM - RIGHT EYE: OD_EXAM: NORMAL

## 2024-08-09 NOTE — PROGRESS NOTES
"HPI: Zach Zaragoaz is a 48 year old male here for 15 week follow up of cystoid macular edema left eye. He reports that his vision is stable. No flashes. No eye pain or irritation.    Current ocular medications: None.  Prior ocular medications: Predforte (ineffective for cystoid macular edema).    Ocular history:   1. 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   -  sudden onset of blurry vision \"patchy\" left eye only. He was seen by an eye care provider in [place], told that he had \"burst capillaries\" in the back of his left eye, and treated with corticosteroid eye drops left eye. Mr. Zaragoza reports that his vision left eye seemed to return to normal after 2 weeks of eye drops, but he did not follow up for eye care at that time. About [id number] he noticed that vision left eye was blurry again, but he did not seek eye care until Oct 2018 with Dr. Johnson and did not present to uveitis clinic until 2019.   -  S/p intravitreal Avastin left eye starting 10.16.19 (incomplete response), switched to Eylea 10.30.20  2. High myopia both eyes.  3. Vasovagal syncope following ocular injections.  4. No history of ocular trauma, surgery, or laser.    Medical history:  1. No chronic medication conditions.  2. Syncopal episode with emesis in 2018 -> unremarkable physical exam/workup at [facility] [place].  3. Uses over the counter melatonin.  4. S/p wisdom teeth extraction ().    Family history: Positive for retinal vascular disease \"a blood vessel \" (), diabetes mellitus, cancer, arthritis. Negative for autoimmune disease.    Social history: Quit smoking in ; 1-2 alcoholic beverages per day; no IVDU. Works as a . [place] , but [place]    Laboratory/Imaging Results:  18 complete blood count (CBC) without diff and HIV normal/negative. Glucose mildly elevated at 108.  9..18 chest x-ray read " as normal.    Ocular Imaging:  Macular OCT 8.9.24:  Right eye: within normal limits, stable.  Left eye: good definition of foveal contour, still with mild residual noncentral cystoid macular edema, hyperreflective intraretinal puncta, stable mild attenuation of EZ centrally    Retinal nerve fiber layer OCT 7.19.19:  Right eye: mild vitreopapillary traction, no thinning, G106  Left eye: mild increase in nerve fiber layer thickness compared to right eye, G130    Optos fluorescein angiography 7.19.19:  Right eye: ?normal (poor patient cooperation)  Left eye: transit not visualized (poor patient cooperation) but both A/V filled at 30 sec, late petalloid macular leakage, late staining along large venuoles, peripheral point aneurysm-like leakage    Impression/Plan:  1. 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 Not consistent with diabetic retinopathy and blood glucose 108 in 2018 (likely not fasting test). Unlikely to be ocular ischemic syndrome.   - Additional testing for an underlying inflammatory or systemic vascular etiology should be considered if the right eye develops similar retinal vascular changes, but observe for now   - Could consider laser photocoagulation of peripheral aneurysms and areas of nonperfusion, again discussed with patient but he is not interested at this time    2. Cystoid macular edema left eye secondary to #1, likely chronic given ellipsoid zone attenuation on OCT and history. Cystoid macular edema still present but improved after switching to Eylea given incomplete response to monthly Avastin. Today, control of cystoid macular edema is borderline at 15 week Eylea interval.   -  S/p intravitreal Avastin left eye 10.16.19, 12.20.19, 4.10.20, 7.3.20, 8.14.20, 9.18.20   - S/p intravitreal  Eylea left eye 10.30.20, 12.1.20, 1.29.21, 3.26.21, 6.18.21, 9.24.21, 1.28.22, 4.15.22, 7.22.22, 10.28.22, 3.10.23, 6.20.23, 10.13.23, 1.19.24, 4.26.24, 8.9.24   - Eylea left eye given today; decrease Eylea interval to 13 weeks    3. Recurrent vasovagal syncope after injection; did not occur today. Wife able to be present today.   - Continue to recommend that Zach hydrate just prior to additional injections and if possible have wife be present    Return to uveitis clinic V/T/D/mac OCT/Eylea left eye in 13 weeks    Attending Physician Attestation:  Complete documentation of historical and exam elements from today's encounter can be found in the full encounter summary report (not reduplicated in this progress note).  I personally obtained the chief complaint(s) and history of present illness.  I confirmed and edited as necessary the review of systems, past medical/surgical history, family history, social history, and examination findings as documented by others; and I examined the patient myself.  I personally reviewed the relevant tests, images, and reports as documented above.  I formulated and edited as necessary the assessment and plan and discussed the findings and management plan with the patient and family.  - Wanda Duggan M.D.

## 2024-08-09 NOTE — NURSING NOTE
Chief Complaints and History of Present Illnesses   Patient presents with    Uveitis Follow-Up     3 month Uveitis follow up   Pt reports no change since last visit  Criselda NAIK 2:08 PM August 9, 2024        Chief Complaint(s) and History of Present Illness(es)       Uveitis Follow-Up              Laterality: left eye    Associated symptoms: floaters.  Negative for eye pain, pain with eye movement, photophobia and flashes    Treatments tried: no treatments    Comments: 3 month Uveitis follow up   Pt reports no change since last visit  Criselda NAIK 2:08 PM August 9, 2024

## 2024-09-22 ENCOUNTER — HEALTH MAINTENANCE LETTER (OUTPATIENT)
Age: 49
End: 2024-09-22

## 2025-04-17 ENCOUNTER — OFFICE VISIT (OUTPATIENT)
Dept: OPHTHALMOLOGY | Facility: CLINIC | Age: 50
End: 2025-04-17
Attending: OPHTHALMOLOGY
Payer: COMMERCIAL

## 2025-04-17 DIAGNOSIS — H35.352 CME (CYSTOID MACULAR EDEMA), LEFT: Primary | ICD-10-CM

## 2025-04-17 DIAGNOSIS — H35.012 RETINAL VASCULAR CHANGES, LEFT: ICD-10-CM

## 2025-04-17 PROCEDURE — 67028 INJECTION EYE DRUG: CPT | Mod: LT | Performed by: OPHTHALMOLOGY

## 2025-04-17 PROCEDURE — 92015 DETERMINE REFRACTIVE STATE: CPT

## 2025-04-17 PROCEDURE — 99212 OFFICE O/P EST SF 10 MIN: CPT | Performed by: OPHTHALMOLOGY

## 2025-04-17 PROCEDURE — 92134 CPTRZ OPH DX IMG PST SGM RTA: CPT | Performed by: OPHTHALMOLOGY

## 2025-04-17 PROCEDURE — 250N000011 HC RX IP 250 OP 636: Mod: JZ | Performed by: OPHTHALMOLOGY

## 2025-04-17 RX ADMIN — AFLIBERCEPT 2 MG: 40 INJECTION, SOLUTION INTRAVITREAL at 11:32

## 2025-04-17 ASSESSMENT — CUP TO DISC RATIO
OS_RATIO: 0.2
OD_RATIO: 0.2

## 2025-04-17 ASSESSMENT — SLIT LAMP EXAM - LIDS
COMMENTS: NORMAL
COMMENTS: NORMAL

## 2025-04-17 ASSESSMENT — REFRACTION_WEARINGRX
OS_SPHERE: -13.25
OS_AXIS: 047
OD_AXIS: 149
OS_CYLINDER: +2.50
OD_SPHERE: -11.75
SPECS_TYPE: SVL
OD_CYLINDER: +2.25

## 2025-04-17 ASSESSMENT — REFRACTION_MANIFEST
OS_SPHERE: -13.25
OD_SPHERE: -12.00
OS_ADD: +1.75
OD_AXIS: 150
OD_CYLINDER: +2.50
OS_CYLINDER: +2.25
OD_ADD: +1.75
OS_AXIS: 045

## 2025-04-17 ASSESSMENT — TONOMETRY
OD_IOP_MMHG: 19
IOP_METHOD: TONOPEN
OS_IOP_MMHG: 20

## 2025-04-17 ASSESSMENT — EXTERNAL EXAM - LEFT EYE: OS_EXAM: NORMAL

## 2025-04-17 ASSESSMENT — CONF VISUAL FIELD
OS_NORMAL: 1
OD_SUPERIOR_NASAL_RESTRICTION: 0
OS_INFERIOR_TEMPORAL_RESTRICTION: 0
METHOD: COUNTING FINGERS
OD_SUPERIOR_TEMPORAL_RESTRICTION: 0
OD_NORMAL: 1
OS_INFERIOR_NASAL_RESTRICTION: 0
OD_INFERIOR_TEMPORAL_RESTRICTION: 0
OD_INFERIOR_NASAL_RESTRICTION: 0
OS_SUPERIOR_NASAL_RESTRICTION: 0
OS_SUPERIOR_TEMPORAL_RESTRICTION: 0

## 2025-04-17 ASSESSMENT — VISUAL ACUITY
OS_CC+: -1
OD_CC: 20/25
METHOD: SNELLEN - LINEAR
CORRECTION_TYPE: GLASSES
OS_CC: 20/60
OS_PH_CC: 20/50

## 2025-04-17 ASSESSMENT — EXTERNAL EXAM - RIGHT EYE: OD_EXAM: NORMAL

## 2025-04-17 NOTE — PROGRESS NOTES
"HPI: Zach Zaragoza is a 49 year old male here for 21 week follow up of cystoid macular edema left eye. He reports that his vision is stable. No flashes. No eye pain or irritation.    Current ocular medications: None.  Prior ocular medications: Predforte (ineffective for cystoid macular edema).    Ocular history:   1. 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   -  sudden onset of blurry vision \"patchy\" left eye only. He was seen by an eye care provider in [place], told that he had \"burst capillaries\" in the back of his left eye, and treated with corticosteroid eye drops left eye. Mr. Zaragoza reports that his vision left eye seemed to return to normal after 2 weeks of eye drops, but he did not follow up for eye care at that time. About [id number] he noticed that vision left eye was blurry again, but he did not seek eye care until Oct 2018 with Dr. Johnson and did not present to uveitis clinic until 2019.   -  S/p intravitreal Avastin left eye starting 10.16.19 (incomplete response), switched to Eylea 10.30.20  2. High myopia both eyes.  3. Vasovagal syncope following ocular injections.  4. No history of ocular trauma, surgery, or laser.    Medical history:  1. No chronic medication conditions.  2. Syncopal episode with emesis in 2018 -> [place] [facility] [place]  3. Uses over the counter melatonin.  4. S/p wisdom teeth extraction ().    Family history: Positive for retinal vascular disease \"a blood vessel \" (), diabetes mellitus, cancer, arthritis. Negative for autoimmune disease.    Social history: Quit smoking in ; 1-2 alcoholic beverages per day; no IVDU. Works as a . [place] , but has traveled to [place], [place], and [facility].    Laboratory/Imaging Results:  18 complete blood count (CBC) without diff and HIV normal/negative. Glucose mildly elevated at 108.  9..18 chest x-ray read " as normal.    Ocular Imaging:  Macular OCT 4.17.25:  Right eye: within normal limits, stable.  Left eye: worsened cystoid macular edema with central thickening, hyperreflective intraretinal puncta, stable mild attenuation of EZ centrally    Retinal nerve fiber layer OCT 7.19.19:  Right eye: mild vitreopapillary traction, no thinning, G106  Left eye: mild increase in nerve fiber layer thickness compared to right eye, G130    Optos fluorescein angiography 7.19.19:  Right eye: ?normal (poor patient cooperation)  Left eye: transit not visualized (poor patient cooperation) but both A/V filled at 30 sec, late petalloid macular leakage, late staining along large venuoles, peripheral point aneurysm-like leakage    Impression/Plan:  1. Retinal vascular changes (peripheral exudates and telangiectasias) left eye, most consistent with adult-onset Maceo disease, although differential diagnosis includes hemoglobinopathy, old retinal vein occlusion, inflammatory retinal vasculitis, other. Not consistent with diabetic retinopathy and blood glucose 108 in 2018 (likely not fasting test). Unlikely to be ocular ischemic syndrome.   - Additional testing for an underlying inflammatory or systemic vascular etiology should be considered if the right eye develops similar retinal vascular changes, but observe for now   - Could consider laser photocoagulation of peripheral aneurysms and areas of nonperfusion, again discussed with patient but he is not interested at this time    2. Cystoid macular edema left eye secondary to #1, likely chronic given ellipsoid zone attenuation on OCT and history. Cystoid macular edema still present but improved after switching to Eylea given incomplete response to monthly Avastin. Today, control of cystoid macular edema is excellent at 15 week Eylea interval.   -  S/p intravitreal Avastin left eye 10.16.19, 12.20.19, 4.10.20, 7.3.20, 8.14.20, 9.18.20   - S/p intravitreal Eylea left eye 10.30.20, 12.1.20,  1.29.21, 3.26.21, 6.18.21, 9.24.21, 1.28.22, 4.15.22, 7.22.22, 10.28.22, 3.10.23, 6.20.23, 10.13.23, 1.19.24, 4.26.24, 8.9.24   - Eylea left eye given today; continue Eylea interval at 15 weeks (will check OCT macular left eye in 1 month as partial dose of intravitreal Eylea was given today 2/2 patient eye movement during injection)    3. Recurrent vasovagal syncope after injection; did not occur today. Wife able to be present today.   - Continue to recommend that Zach hydrate just prior to additional injections and if possible have wife be present    Return for   (1) tech visit in 1 month with macular OCT left eye (no need for any other testing including no need for visual acuity or intraocular pressure testing)  (2) uveitis clinic V/T/D/mac OCT/Eylea left eye in 15 weeks    Attending Physician Attestation:  Complete documentation of historical and exam elements from today's encounter can be found in the full encounter summary report (not reduplicated in this progress note).  I personally obtained the chief complaint(s) and history of present illness.  I confirmed and edited as necessary the review of systems, past medical/surgical history, family history, social history, and examination findings as documented by others; and I examined the patient myself.  I personally reviewed the relevant tests, images, and reports as documented above.  I formulated and edited as necessary the assessment and plan and discussed the findings and management plan with the patient and family.  - Wanda Duggan M.D.

## 2025-04-17 NOTE — NURSING NOTE
Chief Complaints and History of Present Illnesses   Patient presents with    Cystoid Macular Edema Follow Up     Chief Complaint(s) and History of Present Illness(es)       Cystoid Macular Edema Follow Up              Laterality: left eye    Onset: gradual    Onset: months ago    Quality: States va is the same since last visit      Associated symptoms: Negative for photophobia, flashes and floaters    Treatments tried: no treatments    Pain scale: 0/10              Comments    Here for cystoid macular edema left eye   Katy Gagan COT 10:06 AM April 17, 2025

## 2025-05-15 ENCOUNTER — ALLIED HEALTH/NURSE VISIT (OUTPATIENT)
Dept: OPHTHALMOLOGY | Facility: CLINIC | Age: 50
End: 2025-05-15
Attending: OPHTHALMOLOGY
Payer: COMMERCIAL

## 2025-05-15 DIAGNOSIS — H35.352 CME (CYSTOID MACULAR EDEMA), LEFT: Primary | ICD-10-CM

## 2025-05-15 DIAGNOSIS — H35.012 RETINAL VASCULAR CHANGES, LEFT: ICD-10-CM

## 2025-05-15 PROCEDURE — 92134 CPTRZ OPH DX IMG PST SGM RTA: CPT

## 2025-05-15 NOTE — PROGRESS NOTES
Imaging visit only.    5.15.25 mac OCT left eye: good foveal contour, essentially no cystoid macular edema, much improved vs 4.17.25    Excellent outcome; no change to plan. Will see patient back in clinic in 3 months, as scheduled.    Wanda Duggan MD

## 2025-09-04 ENCOUNTER — OFFICE VISIT (OUTPATIENT)
Dept: OPHTHALMOLOGY | Facility: CLINIC | Age: 50
End: 2025-09-04
Attending: OPHTHALMOLOGY
Payer: COMMERCIAL

## 2025-09-04 DIAGNOSIS — H35.012 RETINAL VASCULAR CHANGES, LEFT: ICD-10-CM

## 2025-09-04 DIAGNOSIS — H35.352 CME (CYSTOID MACULAR EDEMA), LEFT: ICD-10-CM

## 2025-09-04 PROCEDURE — 250N000011 HC RX IP 250 OP 636: Mod: JZ | Performed by: OPHTHALMOLOGY

## 2025-09-04 PROCEDURE — 67028 INJECTION EYE DRUG: CPT | Mod: LT | Performed by: OPHTHALMOLOGY

## 2025-09-04 PROCEDURE — 92134 CPTRZ OPH DX IMG PST SGM RTA: CPT | Performed by: OPHTHALMOLOGY

## 2025-09-04 RX ADMIN — AFLIBERCEPT 2 MG: 40 INJECTION, SOLUTION INTRAVITREAL at 13:15

## 2025-09-04 ASSESSMENT — CONF VISUAL FIELD
OS_INFERIOR_NASAL_RESTRICTION: 0
OS_SUPERIOR_TEMPORAL_RESTRICTION: 0
OD_SUPERIOR_TEMPORAL_RESTRICTION: 0
OD_INFERIOR_TEMPORAL_RESTRICTION: 0
OD_INFERIOR_NASAL_RESTRICTION: 0
OS_NORMAL: 1
OS_SUPERIOR_NASAL_RESTRICTION: 0
OD_SUPERIOR_NASAL_RESTRICTION: 0
OS_INFERIOR_TEMPORAL_RESTRICTION: 0
OD_NORMAL: 1
METHOD: COUNTING FINGERS

## 2025-09-04 ASSESSMENT — VISUAL ACUITY
METHOD: SNELLEN - LINEAR
OS_CC: 20/60
OD_CC+: -3
OD_CC: 20/25
CORRECTION_TYPE: GLASSES

## 2025-09-04 ASSESSMENT — REFRACTION_WEARINGRX
OS_AXIS: 047
OD_CYLINDER: +2.25
OD_SPHERE: -11.75
OS_CYLINDER: +2.50
OS_SPHERE: -13.25
OD_AXIS: 149
SPECS_TYPE: SVL

## 2025-09-04 ASSESSMENT — TONOMETRY
OD_IOP_MMHG: 16
OS_IOP_MMHG: 14
IOP_METHOD: TONOPEN

## 2025-09-04 ASSESSMENT — CUP TO DISC RATIO
OS_RATIO: 0.2
OD_RATIO: 0.2

## 2025-09-04 ASSESSMENT — EXTERNAL EXAM - RIGHT EYE: OD_EXAM: NORMAL

## 2025-09-04 ASSESSMENT — SLIT LAMP EXAM - LIDS
COMMENTS: NORMAL
COMMENTS: NORMAL

## 2025-09-04 ASSESSMENT — EXTERNAL EXAM - LEFT EYE: OS_EXAM: NORMAL
